# Patient Record
Sex: MALE | HISPANIC OR LATINO | Employment: FULL TIME | URBAN - METROPOLITAN AREA
[De-identification: names, ages, dates, MRNs, and addresses within clinical notes are randomized per-mention and may not be internally consistent; named-entity substitution may affect disease eponyms.]

---

## 2022-01-06 ENCOUNTER — OFFICE VISIT (OUTPATIENT)
Dept: URGENT CARE | Facility: PHYSICIAN GROUP | Age: 47
End: 2022-01-06
Payer: COMMERCIAL

## 2022-01-06 ENCOUNTER — TELEPHONE (OUTPATIENT)
Dept: SCHEDULING | Facility: IMAGING CENTER | Age: 47
End: 2022-01-06

## 2022-01-06 VITALS
RESPIRATION RATE: 18 BRPM | HEART RATE: 99 BPM | TEMPERATURE: 98.2 F | BODY MASS INDEX: 38.34 KG/M2 | OXYGEN SATURATION: 95 % | WEIGHT: 253 LBS | SYSTOLIC BLOOD PRESSURE: 162 MMHG | DIASTOLIC BLOOD PRESSURE: 100 MMHG | HEIGHT: 68 IN

## 2022-01-06 DIAGNOSIS — S20.212D RIB CONTUSION, LEFT, SUBSEQUENT ENCOUNTER: ICD-10-CM

## 2022-01-06 DIAGNOSIS — I10 HYPERTENSION, UNSPECIFIED TYPE: ICD-10-CM

## 2022-01-06 PROCEDURE — 99203 OFFICE O/P NEW LOW 30 MIN: CPT | Performed by: PHYSICIAN ASSISTANT

## 2022-01-06 RX ORDER — AMLODIPINE BESYLATE 10 MG/1
10 TABLET ORAL DAILY
Qty: 30 TABLET | Refills: 0 | Status: SHIPPED | OUTPATIENT
Start: 2022-01-06 | End: 2022-01-11

## 2022-01-06 ASSESSMENT — ENCOUNTER SYMPTOMS
ROS SKIN COMMENTS: NO BRUISING
PALPITATIONS: 0
TINGLING: 0
HEADACHES: 0
MYALGIAS: 1
DIARRHEA: 0
WHEEZING: 0
BLOOD IN STOOL: 0
COUGH: 0
NAUSEA: 0
ABDOMINAL PAIN: 0
VOMITING: 0
BRUISES/BLEEDS EASILY: 0
SPUTUM PRODUCTION: 0
SHORTNESS OF BREATH: 0
DIZZINESS: 0
CHILLS: 0
FEVER: 0
HEMOPTYSIS: 0
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
## 2022-01-07 ENCOUNTER — TELEPHONE (OUTPATIENT)
Dept: SCHEDULING | Facility: IMAGING CENTER | Age: 47
End: 2022-01-07
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
## 2022-01-11 ENCOUNTER — TELEPHONE (OUTPATIENT)
Dept: MEDICAL GROUP | Facility: LAB | Age: 47
End: 2022-01-11

## 2022-01-11 ENCOUNTER — APPOINTMENT (OUTPATIENT)
Dept: RADIOLOGY | Facility: MEDICAL CENTER | Age: 47
End: 2022-01-11
Attending: PHYSICIAN ASSISTANT
Payer: COMMERCIAL

## 2022-01-11 ENCOUNTER — OFFICE VISIT (OUTPATIENT)
Dept: MEDICAL GROUP | Facility: LAB | Age: 47
End: 2022-01-11
Payer: COMMERCIAL

## 2022-01-11 ENCOUNTER — HOSPITAL ENCOUNTER (OUTPATIENT)
Facility: MEDICAL CENTER | Age: 47
End: 2022-01-11
Attending: PHYSICIAN ASSISTANT
Payer: COMMERCIAL

## 2022-01-11 VITALS
SYSTOLIC BLOOD PRESSURE: 156 MMHG | TEMPERATURE: 97.2 F | HEART RATE: 94 BPM | BODY MASS INDEX: 38.34 KG/M2 | OXYGEN SATURATION: 97 % | DIASTOLIC BLOOD PRESSURE: 104 MMHG | HEIGHT: 68 IN | WEIGHT: 253 LBS | RESPIRATION RATE: 18 BRPM

## 2022-01-11 DIAGNOSIS — S20.212D CONTUSION OF RIB, LEFT, SUBSEQUENT ENCOUNTER: ICD-10-CM

## 2022-01-11 DIAGNOSIS — J02.9 SORE THROAT: ICD-10-CM

## 2022-01-11 DIAGNOSIS — S20.212D CONTUSION OF RIB, LEFT, SUBSEQUENT ENCOUNTER: Primary | ICD-10-CM

## 2022-01-11 DIAGNOSIS — I10 HYPERTENSION, UNCONTROLLED: ICD-10-CM

## 2022-01-11 PROCEDURE — U0005 INFEC AGEN DETEC AMPLI PROBE: HCPCS

## 2022-01-11 PROCEDURE — 71101 X-RAY EXAM UNILAT RIBS/CHEST: CPT | Mod: LT

## 2022-01-11 PROCEDURE — 99214 OFFICE O/P EST MOD 30 MIN: CPT | Performed by: PHYSICIAN ASSISTANT

## 2022-01-11 PROCEDURE — U0003 INFECTIOUS AGENT DETECTION BY NUCLEIC ACID (DNA OR RNA); SEVERE ACUTE RESPIRATORY SYNDROME CORONAVIRUS 2 (SARS-COV-2) (CORONAVIRUS DISEASE [COVID-19]), AMPLIFIED PROBE TECHNIQUE, MAKING USE OF HIGH THROUGHPUT TECHNOLOGIES AS DESCRIBED BY CMS-2020-01-R: HCPCS

## 2022-01-11 RX ORDER — AMLODIPINE BESYLATE 10 MG/1
10 TABLET ORAL DAILY
Qty: 90 TABLET | Refills: 1 | Status: SHIPPED | OUTPATIENT
Start: 2022-01-11 | End: 2022-10-20 | Stop reason: SDUPTHER

## 2022-01-11 RX ORDER — TRAMADOL HYDROCHLORIDE 50 MG/1
50 TABLET ORAL EVERY 12 HOURS PRN
Qty: 20 TABLET | Refills: 0 | Status: SHIPPED | OUTPATIENT
Start: 2022-01-11 | End: 2022-01-21

## 2022-01-11 ASSESSMENT — PATIENT HEALTH QUESTIONNAIRE - PHQ9: CLINICAL INTERPRETATION OF PHQ2 SCORE: 0

## 2022-01-11 NOTE — LETTER
January 11, 2022    To Whom It May Concern:         This is confirmation that Guilherme Williamson attended his scheduled appointment with Sandy Torres P.A.-C. on 1/11/22. Please excuse him from work until 01/31/2022 for medical reasons.        If you have any questions please do not hesitate to call me at the phone number listed below.    Sincerely,          Sandy Torres P.A.-C.  [phone]

## 2022-01-12 DIAGNOSIS — J02.9 SORE THROAT: ICD-10-CM

## 2022-01-12 LAB
COVID ORDER STATUS COVID19: NORMAL
SARS-COV-2 RNA RESP QL NAA+PROBE: DETECTED
SPECIMEN SOURCE: ABNORMAL
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
## 2022-01-12 NOTE — ASSESSMENT & PLAN NOTE
New to me; uncontrolled in office, however, he did not take BP medication today  Currently using Amlodipine 10mg.   Previously was using Lisinopril 40mg in addition to Amlodipine.   Has been working on weight loss and has had great success in this over the past few years.

## 2022-01-12 NOTE — TELEPHONE ENCOUNTER
Patient was unable to get xrays due to COVID test being completed today, states he was kicked out because of this. Patient would like to know what his next steps should be.

## 2022-01-13 ENCOUNTER — TELEPHONE (OUTPATIENT)
Dept: MEDICAL GROUP | Facility: LAB | Age: 47
End: 2022-01-13
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
## 2022-01-13 NOTE — TELEPHONE ENCOUNTER
----- Message from Sandy Torres P.A.-C. sent at 1/13/2022  8:07 AM PST -----  Please let Guilherme know:    You are positive for COVID-19.    You can be around others after:   - 10 days since symptoms first appeared and   - 24 hours with no fever without the use of fever-reducing medications and   - Other symptoms of COVID-19 are improving#     If getting much worse, such as trouble breathing, chest pain, confusion, inability to stay awake you need to go to Emergency Room.     In the meantime, your should quarantine as best as you can   - Tell your close contacts that they may have been exposed to COVID-19. An infected person can spread COVID-19 starting 48 hours (or 2 days) before the person has any symptoms or tests positive.     Let me know if you have any questions or concerns.   Sandy Torres P.A.-C.

## 2022-01-28 ENCOUNTER — OFFICE VISIT (OUTPATIENT)
Dept: MEDICAL GROUP | Facility: LAB | Age: 47
End: 2022-01-28
Payer: COMMERCIAL

## 2022-01-28 VITALS
WEIGHT: 259.1 LBS | HEART RATE: 91 BPM | BODY MASS INDEX: 39.27 KG/M2 | SYSTOLIC BLOOD PRESSURE: 146 MMHG | OXYGEN SATURATION: 97 % | HEIGHT: 68 IN | RESPIRATION RATE: 20 BRPM | DIASTOLIC BLOOD PRESSURE: 92 MMHG | TEMPERATURE: 97.5 F

## 2022-01-28 DIAGNOSIS — S20.212D CONTUSION OF RIB, LEFT, SUBSEQUENT ENCOUNTER: ICD-10-CM

## 2022-01-28 DIAGNOSIS — I10 HYPERTENSION, UNSPECIFIED TYPE: ICD-10-CM

## 2022-01-28 PROCEDURE — 99213 OFFICE O/P EST LOW 20 MIN: CPT | Performed by: PHYSICIAN ASSISTANT

## 2022-01-28 RX ORDER — BENZONATATE 200 MG/1
CAPSULE ORAL
COMMUNITY
Start: 2022-01-19 | End: 2022-02-22

## 2022-01-28 RX ORDER — CYCLOBENZAPRINE HCL 10 MG
TABLET ORAL
COMMUNITY
Start: 2022-01-19 | End: 2022-02-22

## 2022-01-28 RX ORDER — AMOXICILLIN 500 MG/1
CAPSULE ORAL
COMMUNITY
Start: 2022-01-19 | End: 2022-01-28

## 2022-01-30 NOTE — ASSESSMENT & PLAN NOTE
Follow up; pt continues to have rub pain, though this is improving.   Had some setbacks as he had covid and has intense cough, which of course aggravated his rib pain.

## 2022-01-30 NOTE — PROGRESS NOTES
"Subjective:   CC: Guilherme Williamson is a 46 y.o. male here today for BP check     HPI:  Contusion of rib, left, subsequent encounter  Follow up; pt continues to have rub pain, though this is improving.   Had some setbacks as he had covid and has intense cough, which of course aggravated his rib pain.     Hypertension  Follow up; BP is improving  Pt continues to have a significant amount of rib pain which is likely causing this to be slightly more elevated.   Will continue to monitor this.        Current medicines (including changes today)  Current Outpatient Medications   Medication Sig Dispense Refill   • amLODIPine (NORVASC) 10 MG Tab Take 1 Tablet by mouth every day. 90 Tablet 1   • benzonatate (TESSALON) 200 MG capsule      • cyclobenzaprine (FLEXERIL) 10 mg Tab        No current facility-administered medications for this visit.     He  has a past medical history of Hypertension.    ROS   + left sided chest was pain, occasional shortness of breath, no abdominal pain       Objective:     /92 (BP Location: Left arm, Patient Position: Sitting, BP Cuff Size: Large adult)   Pulse 91   Temp 36.4 °C (97.5 °F) (Temporal)   Resp 20   Ht 1.727 m (5' 8\")   Wt 118 kg (259 lb 1.6 oz)   SpO2 97%  Body mass index is 39.4 kg/m².   Physical Exam:  Constitutional: Alert, no distress.  Skin: Warm, dry, good turgor, no rashes in visible areas.  Neck: Trachea midline, no masses, no thyromegaly. No cervical or supraclavicular lymphadenopathy  Respiratory: Unlabored respiratory effort, lungs clear to auscultation, no wheezes, no ronchi.  Cardiovascular: Normal S1, S2, no murmur, no edema.  Psych: Alert and oriented x3, normal affect and mood.    Assessment and Plan:   The following treatment plan was discussed    1. Contusion of rib, left, subsequent encounter  Follow up and stable, unfortunately had a bit of a set back with healing due to recent covid-19 infection.   Continue to monitor symptoms  Conservative measures at " this time     2. Hypertension, unspecified type  BP is improving some  F/u in 4 weeks.   Hopefully rib pain will have reduced by then and we can assess BP without subjective pain concerns.     My total time spent caring for the patient on the day of the encounter was 30 minutes.   This does not include time spent on separately billable procedures/tests.    Followup: Return in about 4 weeks (around 2/25/2022), or if symptoms worsen or fail to improve.       Sandy Torres P.A.-C.  Supervising MD: Dr. Max Morales MD  01/29/22

## 2022-01-30 NOTE — ASSESSMENT & PLAN NOTE
Follow up; BP is improving  Pt continues to have a significant amount of rib pain which is likely causing this to be slightly more elevated.   Will continue to monitor this.

## 2022-02-22 ENCOUNTER — TELEMEDICINE (OUTPATIENT)
Dept: MEDICAL GROUP | Facility: LAB | Age: 47
End: 2022-02-22
Payer: COMMERCIAL

## 2022-02-22 VITALS — BODY MASS INDEX: 39.25 KG/M2 | HEIGHT: 68 IN | WEIGHT: 259 LBS

## 2022-02-22 DIAGNOSIS — S20.212D CONTUSION OF RIB, LEFT, SUBSEQUENT ENCOUNTER: ICD-10-CM

## 2022-02-22 DIAGNOSIS — R10.13 EPIGASTRIC DISCOMFORT: ICD-10-CM

## 2022-02-22 DIAGNOSIS — J01.00 SUBACUTE MAXILLARY SINUSITIS: Primary | ICD-10-CM

## 2022-02-22 PROCEDURE — 99214 OFFICE O/P EST MOD 30 MIN: CPT | Mod: 95 | Performed by: PHYSICIAN ASSISTANT

## 2022-02-22 RX ORDER — OMEPRAZOLE 40 MG/1
40 CAPSULE, DELAYED RELEASE ORAL DAILY
Qty: 14 CAPSULE | Refills: 0 | Status: SHIPPED | OUTPATIENT
Start: 2022-02-22 | End: 2022-03-08

## 2022-02-22 RX ORDER — AMOXICILLIN AND CLAVULANATE POTASSIUM 875; 125 MG/1; MG/1
1 TABLET, FILM COATED ORAL 2 TIMES DAILY
Qty: 14 TABLET | Refills: 0 | Status: SHIPPED | OUTPATIENT
Start: 2022-02-22 | End: 2022-03-01
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
## 2022-02-22 NOTE — ASSESSMENT & PLAN NOTE
New to me; reports that things are improving.   Worse in the morning  Using Tylenol with relief.     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 Lift transformers (very heavy).

## 2022-02-22 NOTE — LETTER
February 22, 2022    To Whom It May Concern:         This is confirmation that Guilherme Williamson attended his scheduled appointment with Sandy Torres P.A.-C. on 2/22/22. Please excuse Guilherme until 03/07/2022, due to ongoing medical issues. He may return to work on 03/08/2022, with restrictions of lifting nothing greater 20lbs. Otherwise, he is able to fully operate [employer].        If you have any questions please do not hesitate to call me at the phone number listed below.    Sincerely,  **Signed electronically     Sandy Torres P.A.-C.  [phone]

## 2022-02-22 NOTE — ASSESSMENT & PLAN NOTE
New to me; reports that he has been having increased sinus pain and pressure x1 week - worse when laying down.   Admits to fevers 2 days ago, resolved with Tylenol.   NKDA.

## 2022-06-15 ENCOUNTER — HOSPITAL ENCOUNTER (OUTPATIENT)
Facility: MEDICAL CENTER | Age: 47
End: 2022-06-15
Attending: PHYSICIAN ASSISTANT
Payer: COMMERCIAL

## 2022-06-15 ENCOUNTER — OFFICE VISIT (OUTPATIENT)
Dept: MEDICAL GROUP | Facility: LAB | Age: 47
End: 2022-06-15
Payer: COMMERCIAL

## 2022-06-15 VITALS
HEART RATE: 109 BPM | HEIGHT: 68 IN | WEIGHT: 253 LBS | SYSTOLIC BLOOD PRESSURE: 148 MMHG | OXYGEN SATURATION: 95 % | RESPIRATION RATE: 18 BRPM | TEMPERATURE: 98.1 F | BODY MASS INDEX: 38.34 KG/M2 | DIASTOLIC BLOOD PRESSURE: 90 MMHG

## 2022-06-15 DIAGNOSIS — J06.9 VIRAL UPPER RESPIRATORY TRACT INFECTION: Primary | ICD-10-CM

## 2022-06-15 DIAGNOSIS — J06.9 VIRAL UPPER RESPIRATORY TRACT INFECTION: ICD-10-CM

## 2022-06-15 PROCEDURE — U0005 INFEC AGEN DETEC AMPLI PROBE: HCPCS

## 2022-06-15 PROCEDURE — 99213 OFFICE O/P EST LOW 20 MIN: CPT | Performed by: PHYSICIAN ASSISTANT

## 2022-06-15 PROCEDURE — U0003 INFECTIOUS AGENT DETECTION BY NUCLEIC ACID (DNA OR RNA); SEVERE ACUTE RESPIRATORY SYNDROME CORONAVIRUS 2 (SARS-COV-2) (CORONAVIRUS DISEASE [COVID-19]), AMPLIFIED PROBE TECHNIQUE, MAKING USE OF HIGH THROUGHPUT TECHNOLOGIES AS DESCRIBED BY CMS-2020-01-R: HCPCS

## 2022-06-15 NOTE — LETTER
Leslie 15, 2022    To Whom It May Concern:         This is confirmation that Guilherme Williamson attended his scheduled appointment with Sandy Torres P.A.-C. on 6/15/22. Please excuse him from work from today, 06/15/2022 through 06/20/2022. May return to work on 06/21/2022.          If you have any questions please do not hesitate to call me at the phone number listed below.    Sincerely,          Sandy Torres P.A.-C.  [phone]

## 2022-06-15 NOTE — ASSESSMENT & PLAN NOTE
New to me; symptoms of URI, runny nose Sunday and into Monday.   Had mild sore throat on Monday, worsened yesterday.   Very mild cough.   Ongoing sore throat - improved with a salt water gargles.   Did a Netti-Pot which helps some.   Using Tylenol prn headache.

## 2022-06-16 NOTE — PROGRESS NOTES
"Subjective:   CC: Guilherme Williamson is a 47 y.o. male here today for concerns for URI.     HPI:  Viral upper respiratory tract infection  New to me; symptoms of URI, runny nose Sunday and into Monday.   Had mild sore throat on Monday, worsened yesterday.   Very mild cough.   Ongoing sore throat - improved with a salt water gargles.   Did a Netti-Pot which helps some.   Using Tylenol prn headache.   Wife has been sick with similar symptoms.        Current medicines (including changes today)  Current Outpatient Medications   Medication Sig Dispense Refill   • Acetaminophen (TYLENOL PO) Take  by mouth.     • amLODIPine (NORVASC) 10 MG Tab Take 1 Tablet by mouth every day. 90 Tablet 1     No current facility-administered medications for this visit.     He  has a past medical history of Hypertension.    ROS   No chest pain, no shortness of breath, no abdominal pain       Objective:     BP (!) 148/90 (BP Location: Left arm, Patient Position: Sitting, BP Cuff Size: Large adult)   Pulse (!) 109   Temp 36.7 °C (98.1 °F) (Temporal)   Resp 18   Ht 1.727 m (5' 8\")   Wt 115 kg (253 lb)   SpO2 95%  Body mass index is 38.47 kg/m².  Physical Exam:  Constitutional: Alert, no distress.  Skin: Warm, dry, good turgor, no rashes in visible areas.  Eye: Equal, round, conjunctiva clear, lids normal.  ENMT: Lips without lesions, good dentition, oropharynx clear. TMs pearly gray bilaterally.  Neck: Trachea midline, no masses, no thyromegaly. No cervical or supraclavicular lymphadenopathy  Respiratory: Unlabored respiratory effort, lungs clear to auscultation, no wheezes, no ronchi.  Cardiovascular: Normal S1, S2, no murmur, no edema.  Psych: Alert and oriented x3, normal affect and mood.    Assessment and Plan:   The following treatment plan was discussed    1. Viral upper respiratory tract infection  New concerns, very mild symptoms at this time.   Will swab for COVID.   Covid precautions discussed. Would be OK to return to work on " Monday, 06/20/2022.   OK to continue to use tylenol or motrin prn headaches.   Continue salt water gargles for sore throat.   F/u in 5-7 days if no improvement, [facility].   - SARS-CoV-2 PCR (24 hour In-House): Collect NP swab in VTM; Future      Followup: Return if symptoms worsen or fail to improve.       Sandy Torres P.A.-C.  Supervising MD: Dr. Max Morales MD  06/16/22

## 2022-10-20 DIAGNOSIS — I10 HYPERTENSION, UNCONTROLLED: ICD-10-CM

## 2022-10-20 NOTE — TELEPHONE ENCOUNTER
Received request via: Pharmacy    Was the patient seen in the last year in this department? Yes  6/15/22  Does the patient have an active prescription (recently filled or refills available) for medication(s) requested? No

## 2022-10-21 RX ORDER — AMLODIPINE BESYLATE 10 MG/1
10 TABLET ORAL DAILY
Qty: 90 TABLET | Refills: 1 | Status: SHIPPED | OUTPATIENT
Start: 2022-10-21 | End: 2023-04-03

## 2022-11-17 ENCOUNTER — OFFICE VISIT (OUTPATIENT)
Dept: URGENT CARE | Facility: PHYSICIAN GROUP | Age: 47
End: 2022-11-17
Payer: COMMERCIAL

## 2022-11-17 VITALS
HEART RATE: 94 BPM | OXYGEN SATURATION: 96 % | TEMPERATURE: 98.4 F | HEIGHT: 68 IN | WEIGHT: 255 LBS | BODY MASS INDEX: 38.65 KG/M2 | RESPIRATION RATE: 14 BRPM | SYSTOLIC BLOOD PRESSURE: 150 MMHG | DIASTOLIC BLOOD PRESSURE: 90 MMHG

## 2022-11-17 DIAGNOSIS — E11.9 TYPE 2 DIABETES MELLITUS WITHOUT COMPLICATION, WITHOUT LONG-TERM CURRENT USE OF INSULIN (HCC): ICD-10-CM

## 2022-11-17 DIAGNOSIS — J32.9 RHINOSINUSITIS: ICD-10-CM

## 2022-11-17 DIAGNOSIS — R35.0 URINARY FREQUENCY: ICD-10-CM

## 2022-11-17 LAB
APPEARANCE UR: CLEAR
BILIRUB UR STRIP-MCNC: NEGATIVE MG/DL
COLOR UR AUTO: YELLOW
GLUCOSE UR STRIP.AUTO-MCNC: 1000 MG/DL
KETONES UR STRIP.AUTO-MCNC: NORMAL MG/DL
LEUKOCYTE ESTERASE UR QL STRIP.AUTO: NEGATIVE
NITRITE UR QL STRIP.AUTO: NEGATIVE
PH UR STRIP.AUTO: 6.5 [PH] (ref 5–8)
PROT UR QL STRIP: NORMAL MG/DL
RBC UR QL AUTO: NEGATIVE
SP GR UR STRIP.AUTO: 1.02
UROBILINOGEN UR STRIP-MCNC: 0.2 MG/DL

## 2022-11-17 PROCEDURE — 81002 URINALYSIS NONAUTO W/O SCOPE: CPT | Performed by: FAMILY MEDICINE

## 2022-11-17 PROCEDURE — 99214 OFFICE O/P EST MOD 30 MIN: CPT | Performed by: FAMILY MEDICINE

## 2022-11-17 RX ORDER — AMOXICILLIN AND CLAVULANATE POTASSIUM 875; 125 MG/1; MG/1
1 TABLET, FILM COATED ORAL 2 TIMES DAILY
Qty: 14 TABLET | Refills: 0 | Status: SHIPPED | OUTPATIENT
Start: 2022-11-17 | End: 2022-11-24

## 2022-11-17 NOTE — LETTER
November 17, 2022         Patient: Guilherme Williamson   YOB: 1975   Date of Visit: 11/17/2022           To Whom it May Concern:    Guilherme Williamson was seen in my clinic on 11/17/2022. He may return to work on 11/19/2022.    If you have any questions or concerns, please don't hesitate to call.        Sincerely,           Jerzy Mederos M.D.  Electronically Signed

## 2022-11-20 ASSESSMENT — ENCOUNTER SYMPTOMS
WEIGHT LOSS: 0
NAUSEA: 0
VOMITING: 0
EYE REDNESS: 0
MYALGIAS: 0
EYE DISCHARGE: 0

## 2022-12-04 ENCOUNTER — OFFICE VISIT (OUTPATIENT)
Dept: URGENT CARE | Facility: PHYSICIAN GROUP | Age: 47
End: 2022-12-04
Payer: COMMERCIAL

## 2022-12-04 VITALS
BODY MASS INDEX: 38.88 KG/M2 | TEMPERATURE: 99 F | OXYGEN SATURATION: 96 % | WEIGHT: 255.7 LBS | SYSTOLIC BLOOD PRESSURE: 152 MMHG | DIASTOLIC BLOOD PRESSURE: 86 MMHG | HEART RATE: 82 BPM | RESPIRATION RATE: 15 BRPM

## 2022-12-04 DIAGNOSIS — H69.93 DYSFUNCTION OF BOTH EUSTACHIAN TUBES: ICD-10-CM

## 2022-12-04 DIAGNOSIS — I10 ELEVATED BLOOD PRESSURE READING IN OFFICE WITH DIAGNOSIS OF HYPERTENSION: ICD-10-CM

## 2022-12-04 DIAGNOSIS — H92.09 OTALGIA, UNSPECIFIED LATERALITY: ICD-10-CM

## 2022-12-04 PROCEDURE — 99213 OFFICE O/P EST LOW 20 MIN: CPT | Performed by: PHYSICIAN ASSISTANT

## 2022-12-04 RX ORDER — CETIRIZINE HYDROCHLORIDE 10 MG/1
10 TABLET ORAL DAILY
Qty: 30 TABLET | Refills: 0 | Status: SHIPPED
Start: 2022-12-04 | End: 2023-09-05

## 2022-12-04 RX ORDER — CETIRIZINE HYDROCHLORIDE 10 MG/1
10 TABLET ORAL DAILY
Qty: 30 TABLET | Refills: 0 | Status: SHIPPED | OUTPATIENT
Start: 2022-12-04 | End: 2023-08-02

## 2022-12-04 NOTE — PROGRESS NOTES
"Subjective:   Guilherme Williamson is a 47 y.o. male who presents for Otalgia (LEFT EAR x2 weeks) and Medication Refill     Left ear pain, treated for sinusitis and OM 11/17, never went away entirely, now feels worse.  Describes ear pressure.  Denies fevers or chills.  No sinus symptoms resolved.  Left ear worse than right.  Cannot use steroids due to eye related issue and vision loss, told by specialist not to use steroids.          Review of Systems   HENT:  Positive for ear pain.      Medications:  amLODIPine Tabs  metFORMIN Tabs    Allergies:             Flonase [fluticasone] and Nkda [no known drug allergy]    Surgical History:       No past surgical history on file.    Past Social Hx:  Guilherme Williamson  reports that he quit smoking about 22 years ago. His smoking use included cigarettes. He has a 1.00 pack-year smoking history. He has never used smokeless tobacco. He reports current alcohol use. He reports that he does not use drugs.     Past Family Hx:   Guilherme Williamson family history includes Diabetes in his paternal grandmother; Hypertension in his father; Stroke in his father and paternal grandfather.       Problem list, medications, and allergies reviewed by myself today in Epic.     Objective:     BP (!) 152/86   Pulse 82   Temp 37.2 °C (99 °F) (Temporal)   Resp 15   Ht (P) 1.727 m (5' 8\")   Wt 116 kg (255 lb 11.2 oz)   SpO2 96%   BMI (P) 38.88 kg/m²     Physical Exam  Vitals and nursing note reviewed.   Constitutional:       General: He is not in acute distress.     Appearance: Normal appearance. He is not ill-appearing or toxic-appearing.   HENT:      Head: Normocephalic.      Right Ear: Hearing and external ear normal. No decreased hearing noted. No drainage, swelling or tenderness. No foreign body. Tympanic membrane is injected and retracted. Tympanic membrane is not erythematous or bulging. Tympanic membrane has decreased mobility.      Left Ear: Hearing and external ear normal. No decreased " hearing noted. No drainage, swelling or tenderness. No foreign body. Tympanic membrane is injected and retracted. Tympanic membrane is not erythematous or bulging. Tympanic membrane has decreased mobility.      Ears:      Comments: Decreased TM mobility bilaterally, somewhat retracted, no evidence of infectious process.  Nonerythematous     Nose: Congestion and rhinorrhea present.      Mouth/Throat:      Mouth: Mucous membranes are moist.      Pharynx: Oropharynx is clear. No oropharyngeal exudate or posterior oropharyngeal erythema.      Tonsils: No tonsillar exudate.   Eyes:      General:         Right eye: No discharge.         Left eye: No discharge.      Pupils: Pupils are equal, round, and reactive to light.   Cardiovascular:      Rate and Rhythm: Normal rate and regular rhythm.      Pulses: Normal pulses.      Heart sounds: Normal heart sounds.   Pulmonary:      Effort: Pulmonary effort is normal. No respiratory distress.      Breath sounds: Normal breath sounds. No stridor or decreased air movement. No wheezing, rhonchi or rales.   Musculoskeletal:      Cervical back: Neck supple. No rigidity.   Lymphadenopathy:      Cervical: No cervical adenopathy.   Skin:     General: Skin is warm.   Neurological:      Mental Status: He is alert.       Assessment/Plan:     Diagnosis and Associated Orders:     1. Elevated blood pressure reading in office with diagnosis of hypertension    2. Otalgia, unspecified laterality  - cetirizine (ZYRTEC ALLERGY) 10 MG Tab; Take 1 Tablet by mouth every day.  Dispense: 30 Tablet; Refill: 0  - Referral to ENT  - cetirizine (ZYRTEC ALLERGY) 10 MG Tab; Take 1 Tablet by mouth every day.  Dispense: 30 Tablet; Refill: 0    3. Dysfunction of both eustachian tubes  - cetirizine (ZYRTEC ALLERGY) 10 MG Tab; Take 1 Tablet by mouth every day.  Dispense: 30 Tablet; Refill: 0  - Referral to ENT  - cetirizine (ZYRTEC ALLERGY) 10 MG Tab; Take 1 Tablet by mouth every day.  Dispense: 30 Tablet; Refill:  0      Comments/MDM:  Exam and symptoms consistent with eustachian tube dysfunction post sinusitis and otitis media.  Do not see any evidence of active infectious process.  Recommend nonsedating antihistamine and nasal saline spray.  Cannot use Flonase due to eye related issues per specialist.  Use Tylenol or ibuprofen for pain.  Will place referral to ENT for follow-up if symptoms do not resolve.  Vital signs stable and reassuring other than mildly elevated blood pressure as addressed below.    Blood pressure was elevated today urgent care. He showed me readings from the last few days work that demonstrated blood pressure is well controlled.   Please measure and record your blood pressure 3 times over 2 weeks and keep notes.  If this is the first time this may or may not be cause for alarm.  A one-time elevated blood pressure reading does not mean that you need treatment.  The diagnosis of high blood pressure requires 2 separate blood pressure measurements above 140/90 on different days.  Your blood pressure should be checked when you are relaxed and have been sitting for about 10 minutes.  Injury, illness, emotional stress, caffeine, and some medicine such as decongestants may elevate blood pressure temporarily.  Treatment for hypertension includes both lifestyle changes and medications.  Weight loss and reducing dietary salt is beneficial.  Smoking cessation and exercise are helpful.  Avoid decongestants and recreational drugs that cause hypertension.  Do not drink alcohol in excess.  Seek immediate medical care if you develop a severe headache, blurred or change in vision, confusion, unusual weakness or numbness, severe chest or abdominal pain, vomiting, breathing problems.      I personally reviewed prior external notes and test results pertinent to today's visit.  Red flags discussed as well as indications to present to the Emergency Department.  Supportive care, natural history, differential diagnoses, and  indications for immediate follow-up discussed.  Patient expresses understanding and agrees to plan.  Patient denies any other questions or concerns.    Follow-up with the primary care physician for recheck, reevaluation, and consideration of further management.      Please note that this dictation was created using voice recognition software. I have made a reasonable attempt to correct obvious errors, but I expect that there are errors of grammar and possibly content that I did not discover before finalizing the note.    This note was electronically signed by Jeanne Donnelly PA-C

## 2023-04-01 DIAGNOSIS — I10 HYPERTENSION, UNCONTROLLED: ICD-10-CM

## 2023-04-03 RX ORDER — AMLODIPINE BESYLATE 10 MG/1
TABLET ORAL
Qty: 60 TABLET | Refills: 0 | Status: SHIPPED | OUTPATIENT
Start: 2023-04-03 | End: 2023-05-30

## 2023-04-03 NOTE — TELEPHONE ENCOUNTER
My chart message sent to patient, Previously notified via [employer]. Notified Pharmacy needs new PCP to refills scripts.

## 2023-04-03 NOTE — TELEPHONE ENCOUNTER
Received request via: Pharmacy    Was the patient seen in the last year in this department? No    Does the patient have an active prescription (recently filled or refills available) for medication(s) requested? No    Does the patient have [facility] and need 100 day supply (blood pressure, diabetes and cholesterol meds only)? Patient does not have SCP    amLODIPine Besylate Oral Tablet 10 MG

## 2023-05-29 DIAGNOSIS — I10 HYPERTENSION, UNCONTROLLED: ICD-10-CM

## 2023-05-30 RX ORDER — AMLODIPINE BESYLATE 10 MG/1
TABLET ORAL
Qty: 60 TABLET | Refills: 0 | Status: SHIPPED | OUTPATIENT
Start: 2023-05-30 | End: 2023-08-02 | Stop reason: SDUPTHER

## 2023-05-30 NOTE — TELEPHONE ENCOUNTER
Received request via: Pharmacy    Was the patient seen in the last year in this department? Yes  LOV 06/15/2022  Does the patient have an active prescription (recently filled or refills available) for medication(s) requested? No    Does the patient have [facility] Plus and need 100 day supply (blood pressure, diabetes and cholesterol meds only)? Patient does not have SCP

## 2023-08-02 ENCOUNTER — OFFICE VISIT (OUTPATIENT)
Dept: MEDICAL GROUP | Facility: LAB | Age: 48
End: 2023-08-02
Payer: COMMERCIAL

## 2023-08-02 VITALS
TEMPERATURE: 97.9 F | SYSTOLIC BLOOD PRESSURE: 148 MMHG | BODY MASS INDEX: 37.79 KG/M2 | DIASTOLIC BLOOD PRESSURE: 90 MMHG | HEART RATE: 84 BPM | RESPIRATION RATE: 14 BRPM | HEIGHT: 68 IN | OXYGEN SATURATION: 94 % | WEIGHT: 249.34 LBS

## 2023-08-02 DIAGNOSIS — I10 HYPERTENSION, UNCONTROLLED: ICD-10-CM

## 2023-08-02 DIAGNOSIS — I10 HYPERTENSION, UNSPECIFIED TYPE: ICD-10-CM

## 2023-08-02 DIAGNOSIS — Z12.11 COLON CANCER SCREENING: ICD-10-CM

## 2023-08-02 DIAGNOSIS — J34.89 SINUS PRESSURE: ICD-10-CM

## 2023-08-02 DIAGNOSIS — Z00.00 HEALTHCARE MAINTENANCE: ICD-10-CM

## 2023-08-02 PROCEDURE — 99203 OFFICE O/P NEW LOW 30 MIN: CPT | Performed by: STUDENT IN AN ORGANIZED HEALTH CARE EDUCATION/TRAINING PROGRAM

## 2023-08-02 PROCEDURE — 3080F DIAST BP >= 90 MM HG: CPT | Performed by: STUDENT IN AN ORGANIZED HEALTH CARE EDUCATION/TRAINING PROGRAM

## 2023-08-02 PROCEDURE — 3077F SYST BP >= 140 MM HG: CPT | Performed by: STUDENT IN AN ORGANIZED HEALTH CARE EDUCATION/TRAINING PROGRAM

## 2023-08-02 RX ORDER — NAPROXEN 500 MG/1
500 TABLET ORAL 2 TIMES DAILY WITH MEALS
Qty: 60 TABLET | Refills: 0 | Status: SHIPPED | OUTPATIENT
Start: 2023-08-02 | End: 2023-08-29

## 2023-08-02 RX ORDER — AMLODIPINE BESYLATE 10 MG/1
10 TABLET ORAL DAILY
Qty: 60 TABLET | Refills: 0 | Status: SHIPPED | OUTPATIENT
Start: 2023-08-02 | End: 2023-09-21

## 2023-08-02 ASSESSMENT — ENCOUNTER SYMPTOMS
SHORTNESS OF BREATH: 0
SINUS PAIN: 1
FEVER: 0
CHILLS: 0

## 2023-08-02 ASSESSMENT — PATIENT HEALTH QUESTIONNAIRE - PHQ9: CLINICAL INTERPRETATION OF PHQ2 SCORE: 0

## 2023-08-02 NOTE — PROGRESS NOTES
"Subjective:     CC: Establishing with new provider     HPI:   Guilherme presents today for the following;    Problem   Sinus Pressure    Patient reports he has been having sinus pressure for the past 2 weeks with ear pressure. No sinus drainage or fevers. He has had this issue in the past .     Hypertension    Is on amlodipine 10mg, he ran out of the prescription and his BP has been elevated          Current Outpatient Medications Ordered in Epic   Medication Sig Dispense Refill    amLODIPine (NORVASC) 10 MG Tab Take 1 Tablet by mouth every day. 60 Tablet 0    cetirizine (ZYRTEC ALLERGY) 10 MG Tab Take 1 Tablet by mouth every day. 30 Tablet 0     No current Epic-ordered facility-administered medications on file.           ROS:  Review of Systems   Constitutional:  Negative for chills and fever.   HENT:  Positive for congestion and sinus pain.    Respiratory:  Negative for shortness of breath.    Cardiovascular:  Negative for chest pain.       Objective:     Exam:  BP (!) 148/90 (BP Location: Right arm, Patient Position: Sitting, BP Cuff Size: Large adult long)   Pulse 84   Temp 36.6 °C (97.9 °F)   Resp 14   Ht 1.727 m (5' 8\")   Wt 113 kg (249 lb 5.4 oz)   SpO2 94%   BMI 37.91 kg/m²  Body mass index is 37.91 kg/m².    Physical Exam  Constitutional:       General: He is not in acute distress.     Appearance: He is not ill-appearing.   HENT:      Right Ear: Tympanic membrane, ear canal and external ear normal.      Left Ear: Tympanic membrane, ear canal and external ear normal.   Pulmonary:      Effort: Pulmonary effort is normal.   Neurological:      Mental Status: He is alert.   Psychiatric:         Mood and Affect: Mood normal.         Behavior: Behavior normal.         Thought Content: Thought content normal.         Judgment: Judgment normal.               Assessment & Plan:     Problem List Items Addressed This Visit       Hypertension     Chronic  -fill amlodipine for 60 days, check labs          Relevant " Medications    amLODIPine (NORVASC) 10 MG Tab    Sinus pressure     Acute  -patient can not use steroids of any kind due to eye problems  -will provide NSAID for inflammation, pt told to take this with an antihistamine.   -if no improvement will prescribe antibiotics if not improvement in 5 days           Other Visit Diagnoses       Colon cancer screening        Relevant Orders    COLOGUARD (FIT DNA)    Hypertension, uncontrolled        Relevant Medications    amLODIPine (NORVASC) 10 MG Tab    Healthcare maintenance        Relevant Orders    VITAMIN D,25 HYDROXY (DEFICIENCY)    HEMOGLOBIN A1C    CBC WITH DIFFERENTIAL    Comp Metabolic Panel    TSH WITH REFLEX TO FT4    Lipid Profile            1 month f/u    Please note that this dictation was created using voice recognition software. I have made every reasonable attempt to correct obvious errors, but I expect that there are errors of grammar and possibly content that I did not discover before finalizing the note.

## 2023-08-02 NOTE — ASSESSMENT & PLAN NOTE
Acute  -patient can not use steroids of any kind due to eye problems  -will provide NSAID for inflammation, pt told to take this with an antihistamine.   -if no improvement will prescribe antibiotics if not improvement in 5 days

## 2023-08-09 DIAGNOSIS — J01.90 ACUTE SINUSITIS, RECURRENCE NOT SPECIFIED, UNSPECIFIED LOCATION: ICD-10-CM

## 2023-08-09 RX ORDER — AZITHROMYCIN 250 MG/1
TABLET, FILM COATED ORAL
Qty: 6 TABLET | Refills: 0 | Status: SHIPPED
Start: 2023-08-09 | End: 2023-08-06

## 2023-08-27 DIAGNOSIS — J34.89 SINUS PRESSURE: ICD-10-CM

## 2023-08-28 NOTE — TELEPHONE ENCOUNTER
Received request via: Pharmacy    Was the patient seen in the last year in this department? Yes    Does the patient have an active prescription (recently filled or refills available) for medication(s) requested? No    Does the patient have residential [facility] and need 100 day supply (blood pressure, diabetes and cholesterol meds only)? Patient does not have SCP      Naproxen Oral Tablet 500 MG

## 2023-08-29 RX ORDER — NAPROXEN 500 MG/1
500 TABLET ORAL 2 TIMES DAILY WITH MEALS
Qty: 60 TABLET | Refills: 0 | Status: SHIPPED
Start: 2023-08-29 | End: 2023-10-23

## 2023-09-05 ENCOUNTER — OFFICE VISIT (OUTPATIENT)
Dept: URGENT CARE | Facility: PHYSICIAN GROUP | Age: 48
End: 2023-09-05
Payer: COMMERCIAL

## 2023-09-05 VITALS
BODY MASS INDEX: 37.22 KG/M2 | OXYGEN SATURATION: 98 % | RESPIRATION RATE: 16 BRPM | WEIGHT: 245.59 LBS | SYSTOLIC BLOOD PRESSURE: 168 MMHG | DIASTOLIC BLOOD PRESSURE: 108 MMHG | HEART RATE: 104 BPM | TEMPERATURE: 98.3 F | HEIGHT: 68 IN

## 2023-09-05 DIAGNOSIS — H65.02 NON-RECURRENT ACUTE SEROUS OTITIS MEDIA OF LEFT EAR: ICD-10-CM

## 2023-09-05 DIAGNOSIS — R09.81 SINUS CONGESTION: ICD-10-CM

## 2023-09-05 PROCEDURE — 99213 OFFICE O/P EST LOW 20 MIN: CPT

## 2023-09-05 PROCEDURE — 3077F SYST BP >= 140 MM HG: CPT

## 2023-09-05 PROCEDURE — 3080F DIAST BP >= 90 MM HG: CPT

## 2023-09-05 RX ORDER — AMOXICILLIN AND CLAVULANATE POTASSIUM 875; 125 MG/1; MG/1
1 TABLET, FILM COATED ORAL 2 TIMES DAILY
Qty: 14 TABLET | Refills: 0 | Status: SHIPPED | OUTPATIENT
Start: 2023-09-05 | End: 2023-09-12

## 2023-09-05 NOTE — LETTER
[facility] [address] [address]     September 5, 2023    Patient: Guilherme Williamson   YOB: 1975   Date of Visit: 9/5/2023       To Whom It May Concern:    Guilherme Williamson was seen and treated in our department on 9/5/2023. Excuse 09/06 as well.      Sincerely,     EVERETTE Woodard.

## 2023-09-05 NOTE — PROGRESS NOTES
Chief Complaint   Patient presents with    Otalgia     Ear pain in RT ear and underneath ear, both ears are clogged, hot flashes, dizziness, since last night and has been worsening.        HISTORY OF PRESENT ILLNESS: Patient is a pleasant 48 y.o. male who presents to urgent care today left-sided ear pain for the last day.  Patient denies any fevers, he does note ongoing sinus pain and pressure as well.  He has not taken anything for his symptoms, denies any major fevers.    Patient Active Problem List    Diagnosis Date Noted    Sinus pressure 2023    Viral upper respiratory tract infection 06/15/2022    Subacute maxillary sinusitis 2022    Contusion of rib, left, subsequent encounter 2022    Hypertension 2014    Tinea cruris 2014    Hyperglycemia 2014    Dyslipidemia 2014    BMI 38.0-38.9,adult 2014       Allergies:Flonase [fluticasone]    Current Outpatient Medications Ordered in Epic   Medication Sig Dispense Refill    amoxicillin-clavulanate (AUGMENTIN) 875-125 MG Tab Take 1 Tablet by mouth 2 times a day for 7 days. 14 Tablet 0    naproxen (NAPROSYN) 500 MG Tab TAKE ONE TABLET BY MOUTH TWICE DAILY WITH FOOD 60 Tablet 0    amLODIPine (NORVASC) 10 MG Tab Take 1 Tablet by mouth every day. 60 Tablet 0     No current Epic-ordered facility-administered medications on file.       Past Medical History:   Diagnosis Date    Hypertension        Social History     Tobacco Use    Smoking status: Former     Current packs/day: 0.00     Average packs/day: 0.3 packs/day for 4.0 years (1.0 ttl pk-yrs)     Types: Cigarettes     Start date: 1996     Quit date: 2000     Years since quittin.1    Smokeless tobacco: Never    Tobacco comments:     3-5 years ago   Vaping Use    Vaping Use: Never used   Substance Use Topics    Alcohol use: Yes     Alcohol/week: 0.0 oz     Comment: a couple beers a month    Drug use: No       Family Status   Relation Name Status    Fa  Alive     "MGFa  Alive    PGMo  Alive    Mo  Alive    Sis  Alive    Bro  Alive    MGMo   at age 32        pneumonia    PGFa      Sis  Alive    Sis  Alive    Sis  Alive    Neg Hx  (Not Specified)     Family History   Problem Relation Age of Onset    Stroke Father     Hypertension Father     Diabetes Paternal Grandmother     Stroke Paternal Grandfather     Cancer Neg Hx     Heart Disease Neg Hx        ROS:  Review of Systems   Constitutional: Negative for fever, chills, weight loss, malaise, and fatigue.   HENT: Positive for left-sided ear pain, negative positive sinus pressure nosebleeds, congestion, sore throat and neck pain.    Eyes: Negative for vision changes.   Neuro: Negative for headache, sensory changes, weakness, seizure, LOC.   Cardiovascular: Negative for chest pain, palpitations, orthopnea and leg swelling.   Respiratory: Negative for cough, sputum production, shortness of breath and wheezing.   Gastrointestinal: Negative for abdominal pain, nausea, vomiting or diarrhea.   Genitourinary: Negative for dysuria, urgency and frequency.  Musculoskeletal: Negative for falls, neck pain, back pain, joint pain, myalgias.   Skin: Negative for rash, diaphoresis.     Exam:  BP (!) 168/108 (BP Location: Right arm, Patient Position: Sitting, BP Cuff Size: Adult)   Pulse (!) 104   Temp 36.8 °C (98.3 °F) (Temporal)   Resp 16   Ht 1.727 m (5' 8\")   Wt 111 kg (245 lb 9.5 oz)   SpO2 98%   General: well-nourished, well-developed male in NAD  Head: normocephalic, atraumatic  Eyes: PERRLA, no conjunctival injection, acuity grossly intact, lids normal.  Ears: normal shape and symmetry, no tenderness, no discharge. External canals are without any significant edema or erythema. Tympanic membranes have noted inflammation, with effusion effusion on the left, right-sided is within normal limits. Gross auditory acuity is intact.  Nose: symmetrical without tenderness, no discharge.  Noted sinus pain and pressure with light " palpation to the sinuses.  Mouth/Throat: reasonable hygiene, no erythema, exudates or tonsillar enlargement.  Neck: no masses, range of motion within normal limits, no tracheal deviation. No obvious thyroid enlargement.   Lymph: no cervical adenopathy. No supraclavicular adenopathy.   Neuro: alert and oriented. Cranial nerves 1-12 grossly intact. No sensory deficit.   Cardiovascular: regular rate and rhythm. No edema.  Pulmonary: no distress. Chest is symmetrical with respiration, no wheezes, crackles, or rhonchi.   Abdomen: soft, non-tender, no guarding, no hepatosplenomegaly.  Musculoskeletal: no clubbing, appropriate muscle tone, gait is stable.  Skin: warm, dry, intact, no clubbing, no cyanosis, no rashes.   Psych: appropriate mood, affect, judgement.       Assessment/Plan:  1. Non-recurrent acute serous otitis media of left ear  amoxicillin-clavulanate (AUGMENTIN) 875-125 MG Tab      Patient is a pleasant 48 y.o. male who presents to urgent care today left-sided ear pain for the last day.  Patient denies any fevers, he does note ongoing sinus pain and pressure as well.  He has not taken anything for his symptoms, denies any major fevers.  On physical exam patient has noted inflammation, with effusion effusion on the left, right-sided is within normal limits.  Patient placed on Augmentin, this should cover for potential sinusitis as well patient does have this due to his ongoing increased sinus pain and pressure.  Patient encouraged to drink plenty of fluids, Motrin and Tylenol for any ongoing discomfort, take medication with food.  Advised to follow-up if he continues to get worse or does not improve.    Noted elevated blood pressure and slightly elevated heart rate today here in the office.  Patient states he has not taken his Norvasc.  He was unsure if he should as he was coming to the doctor.  Encourage patient to please take his medications as ordered.  Advised to follow-up if he has blood pressure  continues to be elevated.      Supportive care, differential diagnoses, and indications for immediate follow-up discussed with patient.   Pathogenesis of diagnosis discussed including typical length and natural progression.   Instructed to return to clinic or nearest emergency department for any change in condition, further concerns, or worsening of symptoms.  Patient states understanding of the plan of care and discharge instructions.  Instructed to make an appointment, for follow up, with primary care provider.      Please note that this dictation was created using voice recognition software. I have made every reasonable attempt to correct obvious errors, but I expect that there are errors of grammar and possibly content that I did not discover before finalizing the note.      Irina CALDERON

## 2023-10-07 SDOH — ECONOMIC STABILITY: TRANSPORTATION INSECURITY
IN THE PAST 12 MONTHS, HAS THE LACK OF TRANSPORTATION KEPT YOU FROM MEDICAL APPOINTMENTS OR FROM GETTING MEDICATIONS?: NO

## 2023-10-07 SDOH — HEALTH STABILITY: PHYSICAL HEALTH: ON AVERAGE, HOW MANY DAYS PER WEEK DO YOU ENGAGE IN MODERATE TO STRENUOUS EXERCISE (LIKE A BRISK WALK)?: 5 DAYS

## 2023-10-07 SDOH — HEALTH STABILITY: MENTAL HEALTH
STRESS IS WHEN SOMEONE FEELS TENSE, NERVOUS, ANXIOUS, OR CAN'T SLEEP AT NIGHT BECAUSE THEIR MIND IS TROUBLED. HOW STRESSED ARE YOU?: TO SOME EXTENT

## 2023-10-07 SDOH — ECONOMIC STABILITY: INCOME INSECURITY: IN THE LAST 12 MONTHS, WAS THERE A TIME WHEN YOU WERE NOT ABLE TO PAY THE MORTGAGE OR RENT ON TIME?: NO

## 2023-10-07 SDOH — ECONOMIC STABILITY: INCOME INSECURITY: HOW HARD IS IT FOR YOU TO PAY FOR THE VERY BASICS LIKE FOOD, HOUSING, MEDICAL CARE, AND HEATING?: PATIENT DECLINED

## 2023-10-07 SDOH — ECONOMIC STABILITY: HOUSING INSECURITY
IN THE LAST 12 MONTHS, WAS THERE A TIME WHEN YOU DID NOT HAVE A STEADY PLACE TO SLEEP OR SLEPT IN A SHELTER (INCLUDING NOW)?: NO

## 2023-10-07 SDOH — ECONOMIC STABILITY: FOOD INSECURITY: WITHIN THE PAST 12 MONTHS, YOU WORRIED THAT YOUR FOOD WOULD RUN OUT BEFORE YOU GOT MONEY TO BUY MORE.: NEVER TRUE

## 2023-10-07 SDOH — HEALTH STABILITY: PHYSICAL HEALTH: ON AVERAGE, HOW MANY MINUTES DO YOU ENGAGE IN EXERCISE AT THIS LEVEL?: 20 MIN

## 2023-10-07 SDOH — ECONOMIC STABILITY: FOOD INSECURITY: WITHIN THE PAST 12 MONTHS, THE FOOD YOU BOUGHT JUST DIDN'T LAST AND YOU DIDN'T HAVE MONEY TO GET MORE.: NEVER TRUE

## 2023-10-07 SDOH — ECONOMIC STABILITY: TRANSPORTATION INSECURITY
IN THE PAST 12 MONTHS, HAS LACK OF TRANSPORTATION KEPT YOU FROM MEETINGS, WORK, OR FROM GETTING THINGS NEEDED FOR DAILY LIVING?: NO

## 2023-10-07 SDOH — ECONOMIC STABILITY: HOUSING INSECURITY: IN THE LAST 12 MONTHS, HOW MANY PLACES HAVE YOU LIVED?: 1

## 2023-10-07 SDOH — ECONOMIC STABILITY: TRANSPORTATION INSECURITY
IN THE PAST 12 MONTHS, HAS LACK OF RELIABLE TRANSPORTATION KEPT YOU FROM MEDICAL APPOINTMENTS, MEETINGS, WORK OR FROM GETTING THINGS NEEDED FOR DAILY LIVING?: NO

## 2023-10-07 ASSESSMENT — LIFESTYLE VARIABLES
HOW OFTEN DO YOU HAVE A DRINK CONTAINING ALCOHOL: MONTHLY OR LESS
HOW OFTEN DO YOU HAVE A DRINK CONTAINING ALCOHOL: MONTHLY OR LESS
SKIP TO QUESTIONS 9-10: 1
HOW OFTEN DO YOU HAVE SIX OR MORE DRINKS ON ONE OCCASION: NEVER
AUDIT-C TOTAL SCORE: 1
SKIP TO QUESTIONS 9-10: 1
HOW MANY STANDARD DRINKS CONTAINING ALCOHOL DO YOU HAVE ON A TYPICAL DAY: 1 OR 2
HOW OFTEN DO YOU HAVE A DRINK CONTAINING ALCOHOL: MONTHLY OR LESS
HOW MANY STANDARD DRINKS CONTAINING ALCOHOL DO YOU HAVE ON A TYPICAL DAY: 1 OR 2
HOW OFTEN DO YOU HAVE SIX OR MORE DRINKS ON ONE OCCASION: NEVER
AUDIT-C TOTAL SCORE: 1
AUDIT-C TOTAL SCORE: 1
HOW MANY STANDARD DRINKS CONTAINING ALCOHOL DO YOU HAVE ON A TYPICAL DAY: 1 OR 2
HOW OFTEN DO YOU HAVE SIX OR MORE DRINKS ON ONE OCCASION: NEVER
SKIP TO QUESTIONS 9-10: 1

## 2023-10-07 ASSESSMENT — SOCIAL DETERMINANTS OF HEALTH (SDOH)
DO YOU BELONG TO ANY CLUBS OR ORGANIZATIONS SUCH AS CHURCH GROUPS UNIONS, FRATERNAL OR ATHLETIC GROUPS, OR SCHOOL GROUPS?: NO
DO YOU BELONG TO ANY CLUBS OR ORGANIZATIONS SUCH AS CHURCH GROUPS UNIONS, FRATERNAL OR ATHLETIC GROUPS, OR SCHOOL GROUPS?: NO
HOW MANY DRINKS CONTAINING ALCOHOL DO YOU HAVE ON A TYPICAL DAY WHEN YOU ARE DRINKING: 1 OR 2
IN A TYPICAL WEEK, HOW MANY TIMES DO YOU TALK ON THE PHONE WITH FAMILY, FRIENDS, OR NEIGHBORS?: MORE THAN THREE TIMES A WEEK
HOW OFTEN DO YOU GET TOGETHER WITH FRIENDS OR RELATIVES?: MORE THAN THREE TIMES A WEEK
DO YOU BELONG TO ANY CLUBS OR ORGANIZATIONS SUCH AS CHURCH GROUPS UNIONS, FRATERNAL OR ATHLETIC GROUPS, OR SCHOOL GROUPS?: NO
HOW OFTEN DO YOU ATTEND CHURCH OR RELIGIOUS SERVICES?: NEVER
IN A TYPICAL WEEK, HOW MANY TIMES DO YOU TALK ON THE PHONE WITH FAMILY, FRIENDS, OR NEIGHBORS?: MORE THAN THREE TIMES A WEEK
HOW OFTEN DO YOU ATTEND CHURCH OR RELIGIOUS SERVICES?: NEVER
HOW OFTEN DO YOU HAVE SIX OR MORE DRINKS ON ONE OCCASION: NEVER
HOW OFTEN DO YOU GET TOGETHER WITH FRIENDS OR RELATIVES?: MORE THAN THREE TIMES A WEEK
HOW OFTEN DO YOU ATTENT MEETINGS OF THE CLUB OR ORGANIZATION YOU BELONG TO?: NEVER
HOW OFTEN DO YOU ATTEND CHURCH OR RELIGIOUS SERVICES?: NEVER
DO YOU BELONG TO ANY CLUBS OR ORGANIZATIONS SUCH AS CHURCH GROUPS UNIONS, FRATERNAL OR ATHLETIC GROUPS, OR SCHOOL GROUPS?: NO
HOW OFTEN DO YOU ATTENT MEETINGS OF THE CLUB OR ORGANIZATION YOU BELONG TO?: NEVER
HOW OFTEN DO YOU ATTENT MEETINGS OF THE CLUB OR ORGANIZATION YOU BELONG TO?: NEVER
IN A TYPICAL WEEK, HOW MANY TIMES DO YOU TALK ON THE PHONE WITH FAMILY, FRIENDS, OR NEIGHBORS?: MORE THAN THREE TIMES A WEEK
HOW OFTEN DO YOU ATTEND CHURCH OR RELIGIOUS SERVICES?: NEVER
HOW OFTEN DO YOU GET TOGETHER WITH FRIENDS OR RELATIVES?: MORE THAN THREE TIMES A WEEK
HOW OFTEN DO YOU ATTENT MEETINGS OF THE CLUB OR ORGANIZATION YOU BELONG TO?: NEVER
HOW OFTEN DO YOU HAVE A DRINK CONTAINING ALCOHOL: MONTHLY OR LESS
HOW HARD IS IT FOR YOU TO PAY FOR THE VERY BASICS LIKE FOOD, HOUSING, MEDICAL CARE, AND HEATING?: PATIENT DECLINED
HOW OFTEN DO YOU GET TOGETHER WITH FRIENDS OR RELATIVES?: MORE THAN THREE TIMES A WEEK
WITHIN THE PAST 12 MONTHS, YOU WORRIED THAT YOUR FOOD WOULD RUN OUT BEFORE YOU GOT THE MONEY TO BUY MORE: NEVER TRUE
IN A TYPICAL WEEK, HOW MANY TIMES DO YOU TALK ON THE PHONE WITH FAMILY, FRIENDS, OR NEIGHBORS?: MORE THAN THREE TIMES A WEEK

## 2023-10-09 SDOH — HEALTH STABILITY: PHYSICAL HEALTH: ON AVERAGE, HOW MANY DAYS PER WEEK DO YOU ENGAGE IN MODERATE TO STRENUOUS EXERCISE (LIKE A BRISK WALK)?: 5 DAYS

## 2023-10-09 SDOH — HEALTH STABILITY: PHYSICAL HEALTH: ON AVERAGE, HOW MANY MINUTES DO YOU ENGAGE IN EXERCISE AT THIS LEVEL?: 20 MIN

## 2023-10-09 SDOH — ECONOMIC STABILITY: HOUSING INSECURITY: IN THE LAST 12 MONTHS, HOW MANY PLACES HAVE YOU LIVED?: 1

## 2023-10-09 ASSESSMENT — SOCIAL DETERMINANTS OF HEALTH (SDOH)
HOW HARD IS IT FOR YOU TO PAY FOR THE VERY BASICS LIKE FOOD, HOUSING, MEDICAL CARE, AND HEATING?: PATIENT DECLINED
HOW HARD IS IT FOR YOU TO PAY FOR THE VERY BASICS LIKE FOOD, HOUSING, MEDICAL CARE, AND HEATING?: PATIENT DECLINED
WITHIN THE PAST 12 MONTHS, YOU WORRIED THAT YOUR FOOD WOULD RUN OUT BEFORE YOU GOT THE MONEY TO BUY MORE: NEVER TRUE
HOW OFTEN DO YOU GET TOGETHER WITH FRIENDS OR RELATIVES?: MORE THAN THREE TIMES A WEEK
DO YOU BELONG TO ANY CLUBS OR ORGANIZATIONS SUCH AS CHURCH GROUPS UNIONS, FRATERNAL OR ATHLETIC GROUPS, OR SCHOOL GROUPS?: NO
HOW OFTEN DO YOU HAVE A DRINK CONTAINING ALCOHOL: MONTHLY OR LESS
HOW OFTEN DO YOU ATTEND CHURCH OR RELIGIOUS SERVICES?: NEVER
HOW OFTEN DO YOU HAVE A DRINK CONTAINING ALCOHOL: MONTHLY OR LESS
HOW OFTEN DO YOU ATTENT MEETINGS OF THE CLUB OR ORGANIZATION YOU BELONG TO?: NEVER
DO YOU BELONG TO ANY CLUBS OR ORGANIZATIONS SUCH AS CHURCH GROUPS UNIONS, FRATERNAL OR ATHLETIC GROUPS, OR SCHOOL GROUPS?: NO
HOW OFTEN DO YOU HAVE SIX OR MORE DRINKS ON ONE OCCASION: NEVER
HOW OFTEN DO YOU GET TOGETHER WITH FRIENDS OR RELATIVES?: MORE THAN THREE TIMES A WEEK
HOW MANY DRINKS CONTAINING ALCOHOL DO YOU HAVE ON A TYPICAL DAY WHEN YOU ARE DRINKING: 1 OR 2
HOW OFTEN DO YOU HAVE SIX OR MORE DRINKS ON ONE OCCASION: NEVER
IN A TYPICAL WEEK, HOW MANY TIMES DO YOU TALK ON THE PHONE WITH FAMILY, FRIENDS, OR NEIGHBORS?: MORE THAN THREE TIMES A WEEK
HOW OFTEN DO YOU ATTENT MEETINGS OF THE CLUB OR ORGANIZATION YOU BELONG TO?: NEVER
HOW OFTEN DO YOU ATTEND CHURCH OR RELIGIOUS SERVICES?: NEVER
WITHIN THE PAST 12 MONTHS, YOU WORRIED THAT YOUR FOOD WOULD RUN OUT BEFORE YOU GOT THE MONEY TO BUY MORE: NEVER TRUE
HOW MANY DRINKS CONTAINING ALCOHOL DO YOU HAVE ON A TYPICAL DAY WHEN YOU ARE DRINKING: 1 OR 2
IN A TYPICAL WEEK, HOW MANY TIMES DO YOU TALK ON THE PHONE WITH FAMILY, FRIENDS, OR NEIGHBORS?: MORE THAN THREE TIMES A WEEK

## 2023-10-10 ENCOUNTER — APPOINTMENT (OUTPATIENT)
Dept: MEDICAL GROUP | Facility: LAB | Age: 48
End: 2023-10-10
Payer: COMMERCIAL

## 2023-10-23 ENCOUNTER — APPOINTMENT (OUTPATIENT)
Dept: MEDICAL GROUP | Facility: LAB | Age: 48
End: 2023-10-23
Payer: COMMERCIAL

## 2023-10-23 ENCOUNTER — OFFICE VISIT (OUTPATIENT)
Dept: MEDICAL GROUP | Facility: LAB | Age: 48
End: 2023-10-23
Payer: COMMERCIAL

## 2023-10-23 VITALS
HEART RATE: 90 BPM | DIASTOLIC BLOOD PRESSURE: 96 MMHG | RESPIRATION RATE: 14 BRPM | BODY MASS INDEX: 37.83 KG/M2 | OXYGEN SATURATION: 95 % | HEIGHT: 68 IN | WEIGHT: 249.6 LBS | SYSTOLIC BLOOD PRESSURE: 164 MMHG | TEMPERATURE: 97.5 F

## 2023-10-23 DIAGNOSIS — I10 HYPERTENSION, UNCONTROLLED: ICD-10-CM

## 2023-10-23 DIAGNOSIS — J32.0 CHRONIC MAXILLARY SINUSITIS: ICD-10-CM

## 2023-10-23 DIAGNOSIS — J32.4 CHRONIC PANSINUSITIS: ICD-10-CM

## 2023-10-23 DIAGNOSIS — J34.89 SINUS PRESSURE: ICD-10-CM

## 2023-10-23 DIAGNOSIS — Z76.89 ENCOUNTER TO ESTABLISH CARE: ICD-10-CM

## 2023-10-23 DIAGNOSIS — F43.0 STRESS REACTION: ICD-10-CM

## 2023-10-23 PROBLEM — S20.212D: Status: RESOLVED | Noted: 2022-01-11 | Resolved: 2023-10-23

## 2023-10-23 PROBLEM — J06.9 VIRAL UPPER RESPIRATORY TRACT INFECTION: Status: RESOLVED | Noted: 2022-06-15 | Resolved: 2023-10-23

## 2023-10-23 PROCEDURE — 3080F DIAST BP >= 90 MM HG: CPT | Performed by: NURSE PRACTITIONER

## 2023-10-23 PROCEDURE — 99214 OFFICE O/P EST MOD 30 MIN: CPT | Performed by: NURSE PRACTITIONER

## 2023-10-23 PROCEDURE — 3077F SYST BP >= 140 MM HG: CPT | Performed by: NURSE PRACTITIONER

## 2023-10-23 RX ORDER — DOXYCYCLINE HYCLATE 100 MG
100 TABLET ORAL 2 TIMES DAILY
Qty: 20 TABLET | Refills: 0 | Status: SHIPPED
Start: 2023-10-23 | End: 2023-12-06

## 2023-10-23 RX ORDER — AMLODIPINE BESYLATE 10 MG/1
10 TABLET ORAL DAILY
Qty: 90 TABLET | Refills: 1 | Status: SHIPPED | OUTPATIENT
Start: 2023-10-23

## 2023-10-23 NOTE — ASSESSMENT & PLAN NOTE
Reports that he is having a lot of increased stress at work. This will often cause headaches. Is able to take OTC pain medication with some relief. He has noticed that he will sometimes have mood lability and take his anger out on his wife. Will usually take medication about 2x weekly.

## 2023-10-23 NOTE — NON-PROVIDER
Subjective:     CC:    Chief Complaint   Patient presents with    Establish Care    Sinus Problem     X 2 months  headache , ear ache , stuffy   Hot and cold flashes        HISTORY OF THE PRESENT ILLNESS: Patient is a 48 y.o. male. This pleasant patient is here today to establish care and discuss the following:    -Reviewed all past medical history, family history, social history.  -Reviewed all screening/vaccinations: declines vaccinations today  -Tobacco, alcohol, recreational drug use:  reports that he quit smoking about 23 years ago. His smoking use included cigarettes. He started smoking about 27 years ago. He has a 1.0 pack-year smoking history. He has never used smokeless tobacco.  -Sexually active:  reports being sexually active and has had partner(s) who are female.   -Occupation:  fork      Sinus problems:  Has had symptoms for a long time. It used to be once a year but now it is almost constant and has been the worst the last two months. Has been taking a nasal decongestant which has helped but has not taken away the symptoms completely. He tried a Z-pack, augmentin, and naprosyn which helped for a couple weeks and then it returned. Has tried the benadryl which helps for a little bit and then goes away. He cannot take steroids (in the past he had sudden vision loss which his eye doctor said was caused by having a steroid injection). His neighbor has many cats and he thinks it might be related.   Reports: congestion, L ear pain, hot and cold flashes. Fever last week for 2 days. Headaches when the pressure flares up.   Denies: rhinorrhea, sore throat, itchiness, cough, eye redness/itchiness/drainage.     Hypertension  Stable. Currently taking amlodipine. Usually takes it every day but he forgot to take it today.   He is not taking baby aspirin daily.   He is monitoring BP at home. 120-140s/80-90s.  Denies symptoms low BP: light-headed, tunnel-vision, unusual fatigue.   Denies symptoms high BP:  "palpitations, flushed face.   Reports: headache, blurriness when he has headaches  Denies medicine side effects: unusual fatigue, slow heartbeat, foot/leg swelling, cough.    Stress Reaction:  Reports that he is having a lot of increased stress at work. This will often cause headaches. Is able to take OTC pain medication with some relief. He has noticed he will sometime shave mood lability and take his anger out on his wife. Will usually take medication about 2x weekly. 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    ROS:   Gen: no changes in weight  ENT: no sore throat, no hearing loss, no bloody nose  Pulm: no sob, no cough  CV: no chest pain, no palpitations  GI: no nausea/vomiting, no diarrhea  : no dysuria  MSk: no myalgias  Skin: no rash  Neuro: no headaches, no numbness/tingling  Heme/Lymph: no easy bruising        - NOTE: All other systems reviewed and are negative, except as in HPI.      Objective:     Exam: BP (!) 164/96 (BP Location: Right arm, Patient Position: Sitting, BP Cuff Size: Adult)   Pulse 90   Temp 36.4 °C (97.5 °F)   Resp 14   Ht 1.727 m (5' 8\")   Wt 113 kg (249 lb 9.6 oz)   SpO2 95%  Body mass index is 37.95 kg/m².    General: Normal appearing. No distress.  HEENT: Normocephalic. Eyes conjunctiva clear lids without ptosis, pupils equal and reactive to light accommodation, ears normal shape and contour, canals are erythematous bilaterally, tympanic membranes are benign, nasal turbinates erythematous and edematous bilaterally, oropharynx erythematous, without edema or exudates.   Lymph: Submandibular lymphadenopathy bilaterally   Constitutional: Alert, no distress, well-groomed.  Respiratory: Unlabored respiratory effort, no cough.  MSK: Normal gait, moves all extremities.  Neuro: Grossly " non-focal.   Psych: Alert and oriented x3, normal affect and mood.     Labs: ordered    Assessment & Plan:   48 y.o. male with the following -    1. Hypertension, uncontrolled  - Monitors BP at home and usually has -140s/80-90s. Forgot to take him amlodipine today but usually takes it daily and denies forgetting to take it. Encouraged pt to continue checking his BP at home and to notify clinician if he is getting multiple readings >140/90. He is open to adding a BP medication for improved control if BP remains elevated at subsequent visit.   - amLODIPine (NORVASC) 10 MG Tab; Take 1 Tablet by mouth every day.  Dispense: 90 Tablet; Refill: 1    2. Sinus pressure  - This problem is chronic, and is poorly controlled on the current regimen. Pt reports improvement in symptoms for 2 weeks after taking Augmentin for similar symptoms. Will try course of doxycyline to treat any underlying bacterial sinusitis. Discussed trial of OTC non drowsy allergy medication daily for symptom control. Uses Neti pot at home; continue PRN for symptoms. States he cannot take any type of steroid due to reaction causing permanent vision loss. Will refer to ENT for further evaluation. Declined sinus CT at this time.   - Referral to ENT  - doxycycline (VIBRAMYCIN) 100 MG Tab; Take 1 Tablet by mouth 2 times a day.  Dispense: 20 Tablet; Refill: 0    3. Chronic pansinusitis  - Plan as above  - Referral to ENT  - doxycycline (VIBRAMYCIN) 100 MG Tab; Take 1 Tablet by mouth 2 times a day.  Dispense: 20 Tablet; Refill: 0    4. Stress reaction  - Pt currently involved with HR to resolve issues with co-worker. This is having a substantial negative impact on his life. Pt would like to consider using FMLA leave in the future if situation and stress do not improve.   - Is having h/a's he relates to increased stress which resolve with OTC medication. Okay to continue taking naprosyn as needed. If his h/a's increase in frequency he will contact the  office to consider h/a prevention regimen.     5. Encounter to establish care  -Reviewed all past medical history, family history, social history.  -Reviewed all screening/vaccinations:

## 2023-10-24 NOTE — PROGRESS NOTES
I saw the patient with Bibi Gagnon, student. I performed a physical exam and medical decision making. I reviewed and verified the documentation on 10/23/23 and agree with the content and plan as written by the student.

## 2023-12-06 ENCOUNTER — OFFICE VISIT (OUTPATIENT)
Dept: MEDICAL GROUP | Facility: LAB | Age: 48
End: 2023-12-06
Payer: COMMERCIAL

## 2023-12-06 VITALS
HEART RATE: 80 BPM | RESPIRATION RATE: 14 BRPM | SYSTOLIC BLOOD PRESSURE: 150 MMHG | BODY MASS INDEX: 37.86 KG/M2 | HEIGHT: 68 IN | DIASTOLIC BLOOD PRESSURE: 86 MMHG | TEMPERATURE: 97.2 F | OXYGEN SATURATION: 95 % | WEIGHT: 249.8 LBS

## 2023-12-06 DIAGNOSIS — I10 PRIMARY HYPERTENSION: ICD-10-CM

## 2023-12-06 DIAGNOSIS — F43.0 STRESS REACTION: ICD-10-CM

## 2023-12-06 DIAGNOSIS — J32.9 RECURRENT SINUSITIS: ICD-10-CM

## 2023-12-06 PROCEDURE — 3079F DIAST BP 80-89 MM HG: CPT | Performed by: NURSE PRACTITIONER

## 2023-12-06 PROCEDURE — 3077F SYST BP >= 140 MM HG: CPT | Performed by: NURSE PRACTITIONER

## 2023-12-06 PROCEDURE — 99214 OFFICE O/P EST MOD 30 MIN: CPT | Performed by: NURSE PRACTITIONER

## 2023-12-06 RX ORDER — LOSARTAN POTASSIUM 50 MG/1
50 TABLET ORAL DAILY
Qty: 30 TABLET | Refills: 6 | Status: SHIPPED | OUTPATIENT
Start: 2023-12-06

## 2023-12-06 RX ORDER — PROPRANOLOL HYDROCHLORIDE 10 MG/1
10 TABLET ORAL 2 TIMES DAILY PRN
Qty: 60 TABLET | Refills: 3 | Status: SHIPPED | OUTPATIENT
Start: 2023-12-06

## 2023-12-07 NOTE — ASSESSMENT & PLAN NOTE
Was seen previously for the same. Was prescribed abx at last appointment and reports that his symptoms did improve, but recurred as soon as he stopped the medication. He was also previously referred to ENT, will need to call to schedule an appointment.   Reports: congestion, L ear pain, hot and cold flashes. Fever last week for 2 days. Headaches when the pressure flares up.   Denies: rhinorrhea, sore throat, itchiness, cough, eye redness/itchiness/drainage.

## 2023-12-07 NOTE — PROGRESS NOTES
"Subjective:     CC:   Chief Complaint   Patient presents with    Hypertension Follow-up    Earache     L ear / sinus pressure     Headache     Nausea      HPI:   Guilherme presents today with the following:    Recurrent sinusitis  Was seen previously for the same. Was prescribed abx at last appointment and reports that his symptoms did improve, but recurred as soon as he stopped the medication. He was also previously referred to ENT, will need to call to schedule an appointment.   Reports: congestion, L ear pain, hot and cold flashes. Fever last week for 2 days. Headaches when the pressure flares up.   Denies: rhinorrhea, sore throat, itchiness, cough, eye redness/itchiness/drainage.     Hypertension  Chronic, uncontrolled condition.   Currently taking amlodipine 10 mg daily as directed.   He is not taking baby aspirin daily.   He is monitoring BP at home. Has been elevated in the 160s/90s.  Denies symptoms low BP: light-headed, tunnel-vision, unusual fatigue.   Denies symptoms high BP:pounding headache, visual changes, palpitations, flushed face.   Denies medicine side effects: unusual fatigue, slow heartbeat, foot/leg swelling, cough.    Stress reaction  Chronic condition. Was see previously for the same. Continues to have a lot of increased stress at work. This will often cause headaches and will increase his blood pressure. Is able to take OTC pain medication with some relief. He has noticed that he will sometimes have mood lability and take his anger out on his wife.    ROS:   As documented in history of present illness above    Objective:     Exam: BP (!) 164/88 (BP Location: Right arm, Patient Position: Sitting, BP Cuff Size: Large adult)   Pulse 80   Temp 36.2 °C (97.2 °F)   Resp 14   Ht 1.727 m (5' 8\")   Wt 113 kg (249 lb 12.8 oz)   SpO2 95%  Body mass index is 37.98 kg/m².    Constitutional: Alert, no distress, well-groomed.  Skin: Warm, dry, good turgor, no rashes in visible areas.  Eye: Equal, round " and reactive, conjunctiva clear, lids normal.  ENMT: Lips without lesions, good dentition, moist mucous membranes.  Neck: Trachea midline, no masses, no thyromegaly.  Respiratory: Unlabored respiratory effort, no cough.  MSK: Normal gait, moves all extremities.  Neuro: Grossly non-focal.   Psych: Alert and oriented x3, normal affect and mood.    Assessment & Plan:     48 y.o. male with the following -     1. Recurrent sinusitis  Referral previously placed to ENT, patient will call to schedule an appointment. Flonase as directed. Sleep with HOB elevated, humidifier at night, rest, increase fluid intake. Supportive care, differential diagnoses, and indications for immediate follow-up discussed with patient. Pathogenesis of diagnosis discussed including typical length and natural progression. Instructed to return to clinic for any change in condition, further concerns, or worsening of symptoms. Patient states understanding of the plan of care and discharge instructions.    2. Primary hypertension  Chronic, uncontrolled condition. Patient to take medication as prescribed. Side effects of medication prescribed today were discussed with the patient including how to take the medication and proper dosage. Discussed repercussions of not taking the medication as prescribed. Instructed to call the office should he have any negative side effects or problems with the medication. Labs as indicated. Discussed decreasing salt intake.  Emphasized benefits of exercise and diet.  - losartan (COZAAR) 50 MG Tab; Take 1 Tablet by mouth every day.  Dispense: 30 Tablet; Refill: 6    3. Stress reaction  Chronic, uncontrolled condition. Patient to take medication as prescribed. Side effects of medication prescribed today were discussed with the patient including how to take the medication and proper dosage. Discussed repercussions of not taking the medication as prescribed. Instructed to call the office should he have any negative side  effects or problems with the medication. Denies any suicidal or homicidal ideation. Discussed that should the patient have any symptoms they should call suicide prevention hotline or report to the emergency room immediately. Emphasized importance of healthy diet and exercise.    - propranolol (INDERAL) 10 MG Tab; Take 1 Tablet by mouth 2 times a day as needed (anxiety).  Dispense: 60 Tablet; Refill: 3

## 2023-12-07 NOTE — ASSESSMENT & PLAN NOTE
Chronic condition. Was see previously for the same. Continues to have a lot of increased stress at work. This will often cause headaches and will increase his blood pressure. Is able to take OTC pain medication with some relief. He has noticed that he will sometimes have mood lability and take his anger out on his wife.

## 2023-12-07 NOTE — ASSESSMENT & PLAN NOTE
Chronic, uncontrolled condition.   Currently taking amlodipine 10 mg daily as directed.   He is not taking baby aspirin daily.   He is monitoring BP at home. Has been elevated in the 160s/90s.  Denies symptoms low BP: light-headed, tunnel-vision, unusual fatigue.   Denies symptoms high BP:pounding headache, visual changes, palpitations, flushed face.   Denies medicine side effects: unusual fatigue, slow heartbeat, foot/leg swelling, cough.

## 2023-12-20 ENCOUNTER — OFFICE VISIT (OUTPATIENT)
Dept: URGENT CARE | Facility: PHYSICIAN GROUP | Age: 48
End: 2023-12-20
Payer: COMMERCIAL

## 2023-12-20 DIAGNOSIS — J11.1 INFLUENZA: ICD-10-CM

## 2023-12-20 DIAGNOSIS — R68.89 FLU-LIKE SYMPTOMS: ICD-10-CM

## 2023-12-20 DIAGNOSIS — J02.9 SORE THROAT: ICD-10-CM

## 2023-12-20 LAB
FLUAV RNA SPEC QL NAA+PROBE: POSITIVE
FLUBV RNA SPEC QL NAA+PROBE: NEGATIVE
RSV RNA SPEC QL NAA+PROBE: NEGATIVE
S PYO DNA SPEC NAA+PROBE: NOT DETECTED
SARS-COV-2 RNA RESP QL NAA+PROBE: NEGATIVE

## 2023-12-20 PROCEDURE — 99213 OFFICE O/P EST LOW 20 MIN: CPT | Performed by: NURSE PRACTITIONER

## 2023-12-20 PROCEDURE — 87651 STREP A DNA AMP PROBE: CPT | Performed by: NURSE PRACTITIONER

## 2023-12-20 PROCEDURE — 0241U POCT CEPHEID COV-2, FLU A/B, RSV - PCR: CPT | Performed by: NURSE PRACTITIONER

## 2023-12-20 PROCEDURE — 3079F DIAST BP 80-89 MM HG: CPT | Performed by: NURSE PRACTITIONER

## 2023-12-20 PROCEDURE — 3075F SYST BP GE 130 - 139MM HG: CPT | Performed by: NURSE PRACTITIONER

## 2023-12-20 RX ORDER — OSELTAMIVIR PHOSPHATE 75 MG/1
75 CAPSULE ORAL 2 TIMES DAILY
Qty: 10 CAPSULE | Refills: 0 | Status: SHIPPED | OUTPATIENT
Start: 2023-12-20

## 2023-12-20 NOTE — LETTER
December 20, 2023       Patient: Guilherme Williamson   YOB: 1975   Date of Visit: 12/20/2023         To Whom It May Concern:    In my medical opinion, I recommend that Guilherme Williamson be excused from work 12/20/2023 and 12/21/2023 due to illness.     If you have any questions or concerns, please don't hesitate to call [phone]          Sincerely,          [email or web address] [email or web address].  Electronically Signed

## 2023-12-25 VITALS
RESPIRATION RATE: 14 BRPM | SYSTOLIC BLOOD PRESSURE: 138 MMHG | OXYGEN SATURATION: 97 % | TEMPERATURE: 99.1 F | BODY MASS INDEX: 37.74 KG/M2 | HEIGHT: 68 IN | DIASTOLIC BLOOD PRESSURE: 88 MMHG | WEIGHT: 249 LBS | HEART RATE: 98 BPM

## 2023-12-25 ASSESSMENT — ENCOUNTER SYMPTOMS
CHILLS: 1
HEMOPTYSIS: 0
WHEEZING: 0
FEVER: 1
RHINORRHEA: 1
SORE THROAT: 1
MYALGIAS: 1
SHORTNESS OF BREATH: 0
HEADACHES: 1
COUGH: 1
GASTROINTESTINAL NEGATIVE: 1
VOMITING: 0
EYES NEGATIVE: 1
CARDIOVASCULAR NEGATIVE: 1

## 2023-12-25 NOTE — PROGRESS NOTES
"Subjective:   Guilherme Williamson is a 48 y.o. male who presents for Sore Throat (X3 days: Sore throat, cough, feverish, L ear pain. )      URI   This is a new problem. Episode onset: 3 days. The problem has been rapidly improving. The maximum temperature recorded prior to his arrival was 101 - 101.9 F. The fever has been present for 1 to 2 days. Associated symptoms include congestion, coughing, headaches, rhinorrhea and a sore throat. Pertinent negatives include no ear pain, vomiting or wheezing. He has tried sleep, steam, NSAIDs, increased fluids and decongestant for the symptoms. The treatment provided mild relief.       Review of Systems   Constitutional:  Positive for chills, fever and malaise/fatigue.   HENT:  Positive for congestion, rhinorrhea and sore throat. Negative for ear pain.    Eyes: Negative.    Respiratory:  Positive for cough. Negative for hemoptysis, shortness of breath and wheezing.    Cardiovascular: Negative.    Gastrointestinal: Negative.  Negative for vomiting.   Genitourinary: Negative.    Musculoskeletal:  Positive for myalgias.   Skin: Negative.    Neurological:  Positive for headaches.       Medications, Allergies, and current problem list reviewed today in Epic.     Objective:     BP (!) 148/112   Pulse 98   Temp 37.3 °C (99.1 °F)   Resp 14   Ht 1.727 m (5' 8\")   Wt 113 kg (249 lb)   SpO2 97%     Physical Exam  Vitals reviewed.   Constitutional:       General: He is not in acute distress.     Appearance: Normal appearance. He is ill-appearing.   HENT:      Head: Normocephalic and atraumatic.      Right Ear: Tympanic membrane, ear canal and external ear normal.      Left Ear: Tympanic membrane, ear canal and external ear normal.      Nose: Congestion and rhinorrhea present.      Mouth/Throat:      Mouth: Mucous membranes are moist.      Pharynx: Uvula midline. Pharyngeal swelling and posterior oropharyngeal erythema present. No oropharyngeal exudate or uvula swelling.   Eyes:      " Extraocular Movements: Extraocular movements intact.      Conjunctiva/sclera: Conjunctivae normal.      Pupils: Pupils are equal, round, and reactive to light.   Cardiovascular:      Rate and Rhythm: Normal rate and regular rhythm.      Pulses: Normal pulses.      Heart sounds: Normal heart sounds.   Pulmonary:      Effort: Pulmonary effort is normal. No respiratory distress.      Breath sounds: Normal breath sounds. No wheezing, rhonchi or rales.   Abdominal:      General: Abdomen is flat. Bowel sounds are normal.      Palpations: Abdomen is soft.   Musculoskeletal:         General: Normal range of motion.      Cervical back: Normal range of motion and neck supple. No tenderness.   Lymphadenopathy:      Cervical: No cervical adenopathy.   Skin:     General: Skin is warm and dry.      Capillary Refill: Capillary refill takes less than 2 seconds.   Neurological:      General: No focal deficit present.      Mental Status: He is alert and oriented to person, place, and time.   Psychiatric:         Mood and Affect: Mood normal.         Behavior: Behavior normal.       Results for orders placed or performed in visit on 12/20/23   POCT GROUP A STREP, PCR   Result Value Ref Range    POC Group A Strep, PCR Not Detected Not Detected, Invalid   POCT CoV-2, Flu A/B, RSV by PCR   Result Value Ref Range    SARS-CoV-2 by PCR Negative Negative, Invalid    Influenza virus A RNA Positive (A) Negative, Invalid    Influenza virus B, PCR Negative Negative, Invalid    RSV, PCR Negative Negative, Invalid         Assessment/Plan:     Diagnosis and associated orders:     1. Influenza  oseltamivir (TAMIFLU) 75 MG Cap      2. Flu-like symptoms  POCT CoV-2, Flu A/B, RSV by PCR      3. Sore throat  POCT GROUP A STREP, PCR         Comments/MDM:     Advised patient symptoms are viral in etiology, recommend supportive care. Increased fluids and rest.  Recommend over-the-counter cold and flu medications and Tylenol and/or ibuprofen for symptomatic  relief and fevers.  Discussed use of nedi-pot, humidifier, and Flonase nasal spray as well.  Discussed good hand hygiene and ways to decrease spread of disease.  Follow-up with PCP return for reevaluation if symptoms persist/worsen.  Patient offered discharge instructions regarding flu.  The patient demonstrated a good understanding and agreed with the treatment plan.            Differential diagnosis, natural history, supportive care, and indications for immediate follow-up discussed.    Advised the patient to follow-up with the primary care physician for recheck, reevaluation, and consideration of further management.    Please note that this dictation was created using voice recognition software. I have made a reasonable attempt to correct obvious errors, but I expect that there are errors of grammar and possibly content that I did not discover before finalizing the note.    This note was electronically signed by KVNG Srivastava

## 2024-02-21 ENCOUNTER — HOSPITAL ENCOUNTER (EMERGENCY)
Facility: MEDICAL CENTER | Age: 49
End: 2024-02-21
Attending: EMERGENCY MEDICINE
Payer: COMMERCIAL

## 2024-02-21 VITALS
SYSTOLIC BLOOD PRESSURE: 161 MMHG | BODY MASS INDEX: 36.65 KG/M2 | RESPIRATION RATE: 16 BRPM | TEMPERATURE: 97.2 F | WEIGHT: 241.84 LBS | HEART RATE: 75 BPM | HEIGHT: 68 IN | OXYGEN SATURATION: 97 % | DIASTOLIC BLOOD PRESSURE: 89 MMHG

## 2024-02-21 DIAGNOSIS — R73.9 HYPERGLYCEMIA: ICD-10-CM

## 2024-02-21 DIAGNOSIS — J01.10 ACUTE FRONTAL SINUSITIS, RECURRENCE NOT SPECIFIED: ICD-10-CM

## 2024-02-21 DIAGNOSIS — H81.10 BENIGN PAROXYSMAL POSITIONAL VERTIGO, UNSPECIFIED LATERALITY: ICD-10-CM

## 2024-02-21 LAB
ANION GAP SERPL CALC-SCNC: 21 MMOL/L (ref 7–16)
BASOPHILS # BLD AUTO: 0.6 % (ref 0–1.8)
BASOPHILS # BLD: 0.04 K/UL (ref 0–0.12)
BUN SERPL-MCNC: 12 MG/DL (ref 8–22)
CALCIUM SERPL-MCNC: 9.2 MG/DL (ref 8.4–10.2)
CHLORIDE SERPL-SCNC: 98 MMOL/L (ref 96–112)
CO2 SERPL-SCNC: 18 MMOL/L (ref 20–33)
CREAT SERPL-MCNC: 0.82 MG/DL (ref 0.5–1.4)
EKG IMPRESSION: NORMAL
EOSINOPHIL # BLD AUTO: 0 K/UL (ref 0–0.51)
EOSINOPHIL NFR BLD: 0 % (ref 0–6.9)
ERYTHROCYTE [DISTWIDTH] IN BLOOD BY AUTOMATED COUNT: 38.6 FL (ref 35.9–50)
GFR SERPLBLD CREATININE-BSD FMLA CKD-EPI: 108 ML/MIN/1.73 M 2
GLUCOSE SERPL-MCNC: 406 MG/DL (ref 65–99)
HCT VFR BLD AUTO: 49.1 % (ref 42–52)
HGB BLD-MCNC: 17.9 G/DL (ref 14–18)
IMM GRANULOCYTES # BLD AUTO: 0.03 K/UL (ref 0–0.11)
IMM GRANULOCYTES NFR BLD AUTO: 0.5 % (ref 0–0.9)
LYMPHOCYTES # BLD AUTO: 1.03 K/UL (ref 1–4.8)
LYMPHOCYTES NFR BLD: 15.5 % (ref 22–41)
MCH RBC QN AUTO: 31.4 PG (ref 27–33)
MCHC RBC AUTO-ENTMCNC: 36.5 G/DL (ref 32.3–36.5)
MCV RBC AUTO: 86.1 FL (ref 81.4–97.8)
MONOCYTES # BLD AUTO: 0.23 K/UL (ref 0–0.85)
MONOCYTES NFR BLD AUTO: 3.5 % (ref 0–13.4)
NEUTROPHILS # BLD AUTO: 5.33 K/UL (ref 1.82–7.42)
NEUTROPHILS NFR BLD: 79.9 % (ref 44–72)
NRBC # BLD AUTO: 0 K/UL
NRBC BLD-RTO: 0 /100 WBC (ref 0–0.2)
PLATELET # BLD AUTO: 226 K/UL (ref 164–446)
PMV BLD AUTO: 10 FL (ref 9–12.9)
POTASSIUM SERPL-SCNC: 3.7 MMOL/L (ref 3.6–5.5)
RBC # BLD AUTO: 5.7 M/UL (ref 4.7–6.1)
SODIUM SERPL-SCNC: 137 MMOL/L (ref 135–145)
WBC # BLD AUTO: 6.7 K/UL (ref 4.8–10.8)

## 2024-02-21 PROCEDURE — 96374 THER/PROPH/DIAG INJ IV PUSH: CPT

## 2024-02-21 PROCEDURE — 700102 HCHG RX REV CODE 250 W/ 637 OVERRIDE(OP): Performed by: EMERGENCY MEDICINE

## 2024-02-21 PROCEDURE — A9270 NON-COVERED ITEM OR SERVICE: HCPCS | Performed by: EMERGENCY MEDICINE

## 2024-02-21 PROCEDURE — 80048 BASIC METABOLIC PNL TOTAL CA: CPT

## 2024-02-21 PROCEDURE — 36415 COLL VENOUS BLD VENIPUNCTURE: CPT

## 2024-02-21 PROCEDURE — 85025 COMPLETE CBC W/AUTO DIFF WBC: CPT

## 2024-02-21 PROCEDURE — 700111 HCHG RX REV CODE 636 W/ 250 OVERRIDE (IP): Mod: JZ | Performed by: EMERGENCY MEDICINE

## 2024-02-21 PROCEDURE — 99284 EMERGENCY DEPT VISIT MOD MDM: CPT

## 2024-02-21 PROCEDURE — 93005 ELECTROCARDIOGRAM TRACING: CPT | Performed by: EMERGENCY MEDICINE

## 2024-02-21 RX ORDER — DOXYCYCLINE 100 MG/1
100 CAPSULE ORAL 2 TIMES DAILY
Qty: 14 CAPSULE | Refills: 0 | Status: ACTIVE | OUTPATIENT
Start: 2024-02-21 | End: 2024-02-28

## 2024-02-21 RX ORDER — MECLIZINE HYDROCHLORIDE 25 MG/1
25 TABLET ORAL 3 TIMES DAILY PRN
Qty: 30 TABLET | Refills: 0 | Status: SHIPPED | OUTPATIENT
Start: 2024-02-21

## 2024-02-21 RX ORDER — ONDANSETRON 2 MG/ML
4 INJECTION INTRAMUSCULAR; INTRAVENOUS ONCE
Status: COMPLETED | OUTPATIENT
Start: 2024-02-21 | End: 2024-02-21

## 2024-02-21 RX ORDER — MECLIZINE HYDROCHLORIDE 25 MG/1
25 TABLET ORAL ONCE
Status: COMPLETED | OUTPATIENT
Start: 2024-02-21 | End: 2024-02-21

## 2024-02-21 RX ORDER — ONDANSETRON 4 MG/1
4 TABLET, ORALLY DISINTEGRATING ORAL EVERY 6 HOURS PRN
Qty: 10 TABLET | Refills: 0 | Status: SHIPPED | OUTPATIENT
Start: 2024-02-21 | End: 2024-02-26

## 2024-02-21 RX ADMIN — ONDANSETRON 4 MG: 2 INJECTION INTRAMUSCULAR; INTRAVENOUS at 08:43

## 2024-02-21 RX ADMIN — MECLIZINE HYDROCHLORIDE 25 MG: 25 TABLET ORAL at 08:45

## 2024-02-21 NOTE — ED NOTES
Pt medicated as directed by ER md, Iv placed , labs drawn and taken to lab. poc update given to pt, results pending at this time

## 2024-02-21 NOTE — ED PROVIDER NOTES
ER Provider Note    Scribed for Dr. Isaiah Manrique M.D. by Tricia Blum. 2/21/2024  8:19 AM    Primary Care Provider: TAYLOR Mills    CHIEF COMPLAINT  Nausea and vomiting  -Dizziness, Congestion    EXTERNAL RECORDS REVIEWED  Outpatient Notes The patient was seen in urgent care December 2023 for a sore throat, left ear pain, and cough. He tested positive for the flu.       [facility]    Guilherme Williamson is a 49 y.o. male who presents to the ED for vomiting onset last night. The patient reports that he went to the movies last night and began to feel increasingly dizzy. After returning home he began to feel nauseous and started vomiting. He states that he has been unable to keep anything down since last night including his blood pressure medication. He has associated headache, left ear pain, congestion, sinus pressure, and sensation of room spinning. His dizziness is exacerbated by head movement more so on the left side. He notes that he has had vertigo in the past. He adds that he has had sinus issues for the past 3 weeks and has tried to take Tylenol and a nasal decongestant with little alleviation. There are no known alleviating or exacerbating factors.     PAST MEDICAL HISTORY  Past Medical History:   Diagnosis Date    Contusion of rib, left, subsequent encounter 1/11/2022    Hypertension     Tinea cruris 8/4/2014    Viral upper respiratory tract infection 6/15/2022       SURGICAL HISTORY  No past surgical history noted.    FAMILY HISTORY  Family History   Problem Relation Age of Onset    Stroke Father     Hypertension Father     Diabetes Paternal Grandmother     Stroke Paternal Grandfather     Cancer Neg Hx     Heart Disease Neg Hx        SOCIAL HISTORY   reports that he quit smoking about 23 years ago. His smoking use included cigarettes. He started smoking about 27 years ago. He has a 1 pack-year smoking history. He has never used smokeless tobacco. He reports current alcohol use. He reports that he  "does not use drugs.    CURRENT MEDICATIONS  Current Outpatient Medications   Medication Instructions    amLODIPine (NORVASC) 10 mg, Oral, DAILY    losartan (COZAAR) 50 mg, Oral, DAILY    oseltamivir (TAMIFLU) 75 mg, Oral, 2 TIMES DAILY    propranolol (INDERAL) 10 mg, Oral, 2 TIMES DAILY PRN      ALLERGIES  Flonase [fluticasone] and Other drug    PHYSICAL EXAM  BP (!) 164/112   Pulse 74   Temp 36.1 °C (97 °F) (Temporal)   Resp 14   Ht 1.727 m (5' 8\")   Wt 110 kg (241 lb 13.5 oz)   SpO2 97%   BMI 36.77 kg/m²   Constitutional: Alert in mild distress.  HENT: No signs of trauma, frontal sinus tenderness to palpation, Bilateral external ears normal, Nose normal.   Eyes: No nystagmus, Pupils are equal and reactive, Conjunctiva normal, Non-icteric.   Neck: Normal range of motion, No tenderness, Supple,   Cardiovascular: Regular rate and rhythm, no murmurs.   Thorax & Lungs: Normal breath sounds, No respiratory distress, No wheezing, No chest tenderness.   Abdomen: Bowel sounds normal, Soft, No tenderness, No masses, No pulsatile masses. No peritoneal signs.  Skin: Warm, Dry, No erythema, No rash.   Back: No bony tenderness, No CVA tenderness.   Extremities: No edema, No tenderness, No cyanosis, no tenderness  Neurologic: Left side symptoms of dizziness are worse than right, Symmetric smile, eyes shut tight bilaterally, forehead wrinkles bilaterally, sensation intact to light touch bilateral face, tongue midline, head turn and shoulder shrug with full strength. Hearing intact grossly bilaterally. 5/5  strength bilaterally, 5/5 tricep and bicep strength bilaterally. Sensation intact to light touch r, m, u, axillary nerves bilaterally. 5/5 strength quadricep, plantarflexion/dorsiflexion/extensor hallicus longus bilaterally. Sensation intact to light touch bilateral lower extremities in all nerve distributions. Intact finger-to-nose, gait normal.    Psychiatric: Affect normal     DIAGNOSTIC STUDIES & " PROCEDURES    Labs:   Labs Reviewed   CBC WITH DIFFERENTIAL - Abnormal; Notable for the following components:       Result Value    Neutrophils-Polys 79.90 (*)     Lymphocytes 15.50 (*)     All other components within normal limits   BASIC METABOLIC PANEL - Abnormal; Notable for the following components:    Co2 18 (*)     Glucose 406 (*)     Anion Gap 21.0 (*)     All other components within normal limits   ESTIMATED GFR    All labs reviewed by me.    EKG Interpretation:  Interpreted by me  12 Lead EKG interpreted by me to show:  Normal sinus rhythm  Rate 73  Mildly elevated QTC    COURSE & MEDICAL DECISION MAKING    ED Observation Status? No; Patient does not meet criteria for ED Observation.     INITIAL ASSESSMENT AND PLAN  Care Narrative:       8:19 AM - Patient seen and evaluated at bedside. Discussed plan of care, including labs. Patient agrees to plan of care. Patient will be treated with 4 mg Zofran and 25 mg Antivert for his symptoms. Ordered CBC with diff, BMP, and EKG to evaluate.    9:21 AM - Patient was reevaluated at bedside. The patient is feeling improved. Discussed lab results with the patient. Discussed plan for discharge, including plan for follow-up, and informed them to return to the [facility] ED with any new or worsening symptoms. Patient was given the opportunity for questions, and I addressed all questions or concerns. He is stable for discharge at this time. Patient verbalizes understanding and support with my plan for discharge.     HTN/IDDM FOLLOW UP:  The patient has known hypertension and is being followed by their primary care doctor    ADDITIONAL PROBLEM LIST AND DISPOSITION  Patient presents with symptoms of vertigo.  Ultimately the patient is also having congestion and there is concern for sinusitis and given this has been 3 weeks we will need to treat him for this.  His TMs are normal.  Neuroexam is normal.  I did perform the Marcos-Hallpike maneuver which seemed to help some.  Medications  were given as well.  EKG is normal other than a mildly elevated QTc.  Patient does have elevated glucose of 406.  Patient feeling significantly improved after meclizine.  At this time I will discharge the patient home with strict return precautions and follow-up.  I do not believe this is stroke.  There is no focal neurologic findings.    There is an element of potential food poisoning along with this but no nontender abdominal exam.               DISPOSITION AND DISCUSSIONS  I have discussed management of the patient with the following physicians and СВЕТЛАНА's: None    Discussion of management with other [facility] or appropriate source(s): None     Escalation of care considered, and ultimately not performed: acute inpatient care management, however at this time, the patient is most appropriate for outpatient management.    Barriers to care at this time, including but not limited to:  None .     Decision tools and prescription drugs considered including, but not limited to: Medication modification given medications for vertigo and nausea. .  The patient will return for new or worsening symptoms and is stable at the time of discharge.    DISPOSITION:  Patient will be discharged home in stable condition.    FOLLOW UP:  Yelena Leal, A.P.R.N.  [address] [address] [address]  [phone]    In 2 days        OUTPATIENT MEDICATIONS:  New Prescriptions    DOXYCYCLINE (MONODOX) 100 MG CAPSULE    Take 1 Capsule by mouth 2 times a day for 7 days.    MECLIZINE (ANTIVERT) 25 MG TAB    Take 1 Tablet by mouth 3 times a day as needed for Nausea/Vomiting or Vertigo.    ONDANSETRON (ZOFRAN ODT) 4 MG TABLET DISPERSIBLE    Take 1 Tablet by mouth every 6 hours as needed for Nausea/Vomiting for up to 5 days.        FINAL IMPRESSION   1. Benign paroxysmal positional vertigo, unspecified laterality    2. Acute frontal sinusitis, recurrence not specified         ITricia (Scribkendra), am scribing for, and in the presence  ofIsaiah M.D..    Electronically signed by: Tricia Blum (Scribe), 2/21/2024    IIsaiah M.D. personally performed the services described in this documentation, as scribed by Tricia Blum in my presence, and it is both accurate and complete.    The note accurately reflects work and decisions made by me.  Isaiah Manrique M.D.  2/21/2024  2:01 PM

## 2024-02-21 NOTE — ED NOTES
Pt states he has vertigo, pressure between his eyes, and it feels like his left ear is going to pop. Pt states symptoms started yesterday around 20:00. Pt states he has abdominal pain and cramping as well. Pt states everything is spinning.

## 2024-02-26 ENCOUNTER — OFFICE VISIT (OUTPATIENT)
Dept: URGENT CARE | Facility: CLINIC | Age: 49
End: 2024-02-26
Payer: COMMERCIAL

## 2024-02-26 VITALS
SYSTOLIC BLOOD PRESSURE: 146 MMHG | HEART RATE: 74 BPM | DIASTOLIC BLOOD PRESSURE: 92 MMHG | RESPIRATION RATE: 20 BRPM | WEIGHT: 243.1 LBS | OXYGEN SATURATION: 98 % | BODY MASS INDEX: 36.84 KG/M2 | TEMPERATURE: 97.7 F | HEIGHT: 68 IN

## 2024-02-26 DIAGNOSIS — H92.02 OTALGIA, LEFT: ICD-10-CM

## 2024-02-26 DIAGNOSIS — I10 HYPERTENSION, UNSPECIFIED TYPE: ICD-10-CM

## 2024-02-26 DIAGNOSIS — J01.11 ACUTE RECURRENT FRONTAL SINUSITIS: ICD-10-CM

## 2024-02-26 PROCEDURE — 99214 OFFICE O/P EST MOD 30 MIN: CPT | Performed by: NURSE PRACTITIONER

## 2024-02-26 PROCEDURE — 3080F DIAST BP >= 90 MM HG: CPT | Performed by: NURSE PRACTITIONER

## 2024-02-26 PROCEDURE — 3077F SYST BP >= 140 MM HG: CPT | Performed by: NURSE PRACTITIONER

## 2024-02-26 RX ORDER — AMOXICILLIN AND CLAVULANATE POTASSIUM 875; 125 MG/1; MG/1
1 TABLET, FILM COATED ORAL 2 TIMES DAILY
Qty: 14 TABLET | Refills: 0 | Status: SHIPPED | OUTPATIENT
Start: 2024-02-26 | End: 2024-03-04

## 2024-02-26 RX ORDER — IBUPROFEN 800 MG/1
800 TABLET ORAL EVERY 8 HOURS PRN
Qty: 30 TABLET | Refills: 0 | Status: SHIPPED | OUTPATIENT
Start: 2024-02-26 | End: 2024-02-27

## 2024-02-26 ASSESSMENT — ENCOUNTER SYMPTOMS
COUGH: 0
SORE THROAT: 0
SINUS PAIN: 1
SINUS PRESSURE: 1
HEADACHES: 1
FEVER: 0
CHILLS: 0

## 2024-02-26 NOTE — LETTER
February 26, 2024         Patient: Guilherme Williamson   YOB: 1975   Date of Visit: 2/26/2024           To Whom it May Concern:    Guilherme Williamson was seen in my clinic on 2/26/2024. He may return to work on 2/28/24.    If you have any questions or concerns, please don't hesitate to call.        Sincerely,           EVERETTE Tolentino.  Electronically Signed

## 2024-02-27 ENCOUNTER — OFFICE VISIT (OUTPATIENT)
Dept: MEDICAL GROUP | Facility: LAB | Age: 49
End: 2024-02-27
Payer: COMMERCIAL

## 2024-02-27 VITALS
HEART RATE: 90 BPM | DIASTOLIC BLOOD PRESSURE: 82 MMHG | RESPIRATION RATE: 14 BRPM | SYSTOLIC BLOOD PRESSURE: 126 MMHG | HEIGHT: 68 IN | BODY MASS INDEX: 36.95 KG/M2 | OXYGEN SATURATION: 94 % | TEMPERATURE: 98.1 F | WEIGHT: 243.8 LBS

## 2024-02-27 DIAGNOSIS — J32.9 RECURRENT SINUSITIS: ICD-10-CM

## 2024-02-27 DIAGNOSIS — I10 PRIMARY HYPERTENSION: ICD-10-CM

## 2024-02-27 DIAGNOSIS — J01.40 ACUTE NON-RECURRENT PANSINUSITIS: ICD-10-CM

## 2024-02-27 PROCEDURE — 99213 OFFICE O/P EST LOW 20 MIN: CPT | Performed by: NURSE PRACTITIONER

## 2024-02-27 PROCEDURE — 3074F SYST BP LT 130 MM HG: CPT | Performed by: NURSE PRACTITIONER

## 2024-02-27 PROCEDURE — 3079F DIAST BP 80-89 MM HG: CPT | Performed by: NURSE PRACTITIONER

## 2024-02-27 RX ORDER — AMOXICILLIN AND CLAVULANATE POTASSIUM 875; 125 MG/1; MG/1
1 TABLET, FILM COATED ORAL 2 TIMES DAILY
Qty: 14 TABLET | Refills: 0 | Status: SHIPPED | OUTPATIENT
Start: 2024-02-27 | End: 2024-03-05

## 2024-02-27 RX ORDER — NAPROXEN 500 MG/1
500 TABLET ORAL 2 TIMES DAILY WITH MEALS
Qty: 30 TABLET | Refills: 0 | Status: SHIPPED | OUTPATIENT
Start: 2024-02-27

## 2024-02-27 NOTE — ASSESSMENT & PLAN NOTE
Stable. Currently taking losartan 50 mg daily and amlodipine 10 mg daily as directed.   He is not taking baby aspirin daily.   He is monitoring BP at home.   Denies symptoms low BP: light-headed, tunnel-vision, unusual fatigue.   Denies symptoms high BP:pounding headache, visual changes, palpitations, flushed face.   Denies medicine side effects: unusual fatigue, slow heartbeat, foot/leg swelling, cough.

## 2024-02-27 NOTE — PROGRESS NOTES
"Subjective:     CC:   Chief Complaint   Patient presents with    Follow-Up     BP / ear infection sinus      HPI:   Guilherme presents today with the following:    Hypertension  Stable. Currently taking losartan 50 mg daily and amlodipine 10 mg daily as directed.   He is not taking baby aspirin daily.   He is monitoring BP at home.   Denies symptoms low BP: light-headed, tunnel-vision, unusual fatigue.   Denies symptoms high BP:pounding headache, visual changes, palpitations, flushed face.   Denies medicine side effects: unusual fatigue, slow heartbeat, foot/leg swelling, cough.    Sinusitis  Reports that he has had a sinus infection for the last 8 days. He was also seen at  yesterday for the same. Reports worsening sinus pain, symptoms of vertigo, headache, subjective fever. He was prescribed Augmentin, but had not started this yet. In the past he has had success with taking Naproxen and would like a refill at this time.    ROS:   As documented in history of present illness above    Objective:     Exam: /82 (BP Location: Right arm, Patient Position: Sitting, BP Cuff Size: Large adult)   Pulse 90   Temp 36.7 °C (98.1 °F)   Resp 14   Ht 1.727 m (5' 8\")   Wt 111 kg (243 lb 12.8 oz)   SpO2 94%  Body mass index is 37.07 kg/m².    Constitutional: Alert, no distress, well-groomed.  Skin: Warm, dry, good turgor, no rashes in visible areas.  Eye: Equal, round and reactive, conjunctiva clear, lids normal.  ENMT: Lips without lesions, good dentition, moist mucous membranes.  Neck: Trachea midline, no masses, no thyromegaly.  Respiratory: Unlabored respiratory effort, no cough.  MSK: Normal gait, moves all extremities.  Neuro: Grossly non-focal.   Psych: Alert and oriented x3, normal affect and mood.    Assessment & Plan:     49 y.o. male with the following -     1. Primary hypertension  Well-controlled on current regimen.  Labs as indicated.  Continue antihypertensive medications.  Discussed decreasing salt " intake.  Emphasized benefits of exercise and diet.    2. Recurrent sinusitis  Patient to take antibiotic as directed. Probiotic use encouraged. Flonase as directed. Patient can not use steroids of any kind due to eye problems. Will provide NSAID for inflammation, patient told to take this with an antihistamine. Sleep with HOB elevated, humidifier at night, rest, increase fluid intake. Supportive care, differential diagnoses, and indications for immediate follow-up discussed with patient. Pathogenesis of diagnosis discussed including typical length and natural progression. Instructed to return to clinic for any change in condition, further concerns, or worsening of symptoms. Patient states understanding of the plan of care and discharge instructions.

## 2024-02-27 NOTE — LETTER
February 27, 2024         Patient: Guilherme Williamson   YOB: 1975   Date of Visit: 2/27/2024           To Whom it May Concern:    Guilherme Williamson was seen in my clinic on 2/27/2024. He may return to work on 3/4/2024.    If you have any questions or concerns, please don't hesitate to call.        Sincerely,           EVERETTE Mills.  Electronically Signed

## 2024-02-27 NOTE — PROGRESS NOTES
Subjective:   Guilherme Williamson is a 49 y.o. male who presents for Sinusitis (Patient states that he thinks that he has a sinus infection and ear infection. States that he was seen at the ear for this. Patient states a lot of pressure on his head and the back of his head, States he feels week and tired. )      Sinusitis  This is a recurrent problem. The current episode started in the past 7 days. The problem has been gradually worsening since onset. Associated symptoms include congestion, ear pain, headaches and sinus pressure. Pertinent negatives include no chills, coughing or sore throat. Past treatments include acetaminophen and antibiotics. The treatment provided no relief.       Review of Systems   Constitutional:  Negative for chills, fever and malaise/fatigue.   HENT:  Positive for congestion, ear pain, sinus pressure and sinus pain. Negative for sore throat.    Respiratory:  Negative for cough.    Neurological:  Positive for headaches.       Medications:    amLODIPine Tabs  amoxicillin-clavulanate Tabs  doxycycline  ibuprofen Tabs  losartan Tabs  meclizine Tabs  ondansetron Tbdp  oseltamivir Caps  propranolol Tabs    Allergies: Flonase [fluticasone] and Other drug    Problem List: Guilherme Williamson does not have any pertinent problems on file.    Surgical History:  No past surgical history on file.    Past Social Hx: Guilherme Williamson  reports that he quit smoking about 23 years ago. His smoking use included cigarettes. He started smoking about 27 years ago. He has a 1 pack-year smoking history. He has never used smokeless tobacco. He reports current alcohol use. He reports that he does not use drugs.     Past Family Hx:  Guilherme Williamson family history includes Diabetes in his paternal grandmother; Hypertension in his father; Stroke in his father and paternal grandfather.     Problem list, medications, and allergies reviewed by myself today in Epic.     Objective:     BP (!) 146/92 (BP Location: Left arm,  "Patient Position: Sitting, BP Cuff Size: Adult)   Pulse 74   Temp 36.5 °C (97.7 °F) (Temporal)   Resp 20   Ht 1.727 m (5' 8\")   Wt 110 kg (243 lb 1.6 oz)   SpO2 98%   BMI 36.96 kg/m²     Physical Exam  Vitals and nursing note reviewed.   Constitutional:       General: He is not in acute distress.     Appearance: He is well-developed.   HENT:      Head: Normocephalic and atraumatic.      Right Ear: Tympanic membrane and external ear normal.      Left Ear: Tympanic membrane and external ear normal.      Nose:      Right Sinus: Frontal sinus tenderness present.      Left Sinus: Frontal sinus tenderness present.      Mouth/Throat:      Mouth: Mucous membranes are moist.   Eyes:      Conjunctiva/sclera: Conjunctivae normal.   Cardiovascular:      Rate and Rhythm: Normal rate.   Pulmonary:      Effort: Pulmonary effort is normal. No respiratory distress.      Breath sounds: Normal breath sounds.   Abdominal:      General: There is no distension.   Musculoskeletal:         General: Normal range of motion.   Skin:     General: Skin is warm and dry.   Neurological:      General: No focal deficit present.      Mental Status: He is alert and oriented to person, place, and time. Mental status is at baseline.      Gait: Gait (gait at baseline) normal.   Psychiatric:         Judgment: Judgment normal.         Assessment/Plan:     Diagnosis and associated orders:     1. Acute recurrent frontal sinusitis  amoxicillin-clavulanate (AUGMENTIN) 875-125 MG Tab    ibuprofen (MOTRIN) 800 MG Tab      2. Otalgia, left        3. Hypertension, unspecified type               Comments/MDM:     I personally reviewed prior external notes and prior test results pertinent to today's visit.  Patient previously treated with doxycycline having recurring sinus infection and persistent pain treat with Augmentin.  Patient does have a follow-up tomorrow with PCP.  Discussed management options, risks and benefits, and alternatives to treatment plan " agreed upon.   Red flags discussed and indications to immediately call 911 or present to the Emergency Department.   Supportive care, differential diagnoses, and indications for immediate follow-up discussed with patient.    Patient expresses understanding and agrees to plan. Patient denies any other questions or concerns.            =    Please note that this dictation was created using voice recognition software. I have made a reasonable attempt to correct obvious errors, but I expect that there are errors of grammar and possibly content that I did not discover before finalizing the note.    This note was electronically signed by Gordon CALDERON.

## 2024-04-10 ENCOUNTER — OFFICE VISIT (OUTPATIENT)
Dept: MEDICAL GROUP | Facility: LAB | Age: 49
End: 2024-04-10
Payer: COMMERCIAL

## 2024-04-10 VITALS
HEIGHT: 68 IN | BODY MASS INDEX: 37.74 KG/M2 | OXYGEN SATURATION: 97 % | WEIGHT: 249 LBS | SYSTOLIC BLOOD PRESSURE: 136 MMHG | RESPIRATION RATE: 14 BRPM | TEMPERATURE: 97.6 F | DIASTOLIC BLOOD PRESSURE: 68 MMHG | HEART RATE: 78 BPM

## 2024-04-10 DIAGNOSIS — F43.0 STRESS REACTION: ICD-10-CM

## 2024-04-10 DIAGNOSIS — I10 PRIMARY HYPERTENSION: ICD-10-CM

## 2024-04-10 DIAGNOSIS — H92.02 LEFT EAR PAIN: ICD-10-CM

## 2024-04-10 PROCEDURE — 3078F DIAST BP <80 MM HG: CPT | Performed by: NURSE PRACTITIONER

## 2024-04-10 PROCEDURE — 3075F SYST BP GE 130 - 139MM HG: CPT | Performed by: NURSE PRACTITIONER

## 2024-04-10 PROCEDURE — 99214 OFFICE O/P EST MOD 30 MIN: CPT | Performed by: NURSE PRACTITIONER

## 2024-04-10 RX ORDER — OFLOXACIN 3 MG/ML
5 SOLUTION AURICULAR (OTIC) DAILY
Qty: 10 ML | Refills: 0 | Status: SHIPPED | OUTPATIENT
Start: 2024-04-10

## 2024-04-11 NOTE — ASSESSMENT & PLAN NOTE
Chronic condition. Currently taking losartan 50 mg daily,  amlodipine 10 mg daily, and propranolol 10 mg BID PRN as directed.   He is not taking baby aspirin daily.   He is monitoring BP at home.   Denies symptoms low BP: light-headed, tunnel-vision, unusual fatigue.   Denies symptoms high BP:pounding headache, visual changes, palpitations, flushed face.   Denies medicine side effects: unusual fatigue, slow heartbeat, foot/leg swelling, cough.

## 2024-04-11 NOTE — ASSESSMENT & PLAN NOTE
Chronic condition. Was see previously for the same. Continues to have a lot of increased stress at work. This will often cause headaches and will increase his blood pressure. Also reports increased dizziness when his blood pressure is elevated. BP controlled today in office. Is able to take OTC pain medication with some relief. He has noticed that he will sometimes have mood lability and irritability. He is taking propranolol PRN. He is here today to complete [place] paperwork.

## 2024-04-11 NOTE — PROGRESS NOTES
"Subjective:     CC:   Chief Complaint   Patient presents with    Paperwork     [facility]     HPI:   Guilherme presents today with the following:    Stress reaction  Chronic condition. Was see previously for the same. Continues to have a lot of increased stress at work. This will often cause headaches and will increase his blood pressure. Also reports increased dizziness when his blood pressure is elevated. BP controlled today in office. Is able to take OTC pain medication with some relief. He has noticed that he will sometimes have mood lability and irritability. He is taking propranolol PRN. He is here today to complete [facility] paperwork.     Hypertension  Chronic condition. Currently taking losartan 50 mg daily,  amlodipine 10 mg daily, and propranolol 10 mg BID PRN as directed.   He is not taking baby aspirin daily.   He is monitoring BP at home.   Denies symptoms low BP: light-headed, tunnel-vision, unusual fatigue.   Denies symptoms high BP:pounding headache, visual changes, palpitations, flushed face.   Denies medicine side effects: unusual fatigue, slow heartbeat, foot/leg swelling, cough.    Otalgia  Patient has a history of frequent sinus infections. Was treated several months ago for sinusitis, but continues to have left ear pain. He cannot take steroids as this increases his intraocular pressure and he will have vision changes. Denies hearing loss, congestion, fever, chills, N/V.     ROS:   As documented in history of present illness above    Objective:     Exam: /68 (BP Location: Right arm, Patient Position: Sitting, BP Cuff Size: Large adult)   Pulse 78   Temp 36.4 °C (97.6 °F)   Resp 14   Ht 1.727 m (5' 8\")   Wt 113 kg (249 lb)   SpO2 97%  Body mass index is 37.86 kg/m².    Constitutional: Alert, no distress, well-groomed.  Skin: Warm, dry, good turgor, no rashes in visible areas.  HEENT: Normocephalic. Eyes conjunctiva clear lids without ptosis, pupils equal and reactive to light accommodation, ears " PNDS met, po per I&O sheet. Pt dressed, up in recliner and transported to Phase 2.    normal shape and contour, canals are clear bilaterally, tympanic membranes are benign.   Neck: Trachea midline, no masses, no thyromegaly.  Respiratory: Unlabored respiratory effort, no cough.  MSK: Normal gait, moves all extremities.  Neuro: Grossly non-focal.   Psych: Alert and oriented x3, normal affect and mood.    Assessment & Plan:     49 y.o. male with the following -     1. Left ear pain  Patient to use medication as prescribed. Should be getting better in a couple days, much better by 1 week and resolved by 2 weeks. If not better in 1-2 weeks, if getting worse, or new symptoms develop, discussed to return  Fever, chills, severe pain, headache, getting worse, advised to return.  - ofloxacin otic sol (FLOXIN OTIC) 0.3 % Solution; Administer 5 Drops into affected ear(s) every day.  Dispense: 10 mL; Refill: 0    2. Stress reaction  Chronic condition. Patient continues to have increased anxiety due to stress at work. This causes his blood pressure to become elevated. BP well-controlled today in office. [facility] paperwork completed today in office and original returned to patient. Patient to continue medications as prescribed. Denies any suicidal or homicidal ideation. Discussed that should the patient have any symptoms they should call suicide prevention hotline or report to the emergency room immediately. Emphasized importance of healthy diet and exercise.      3. Primary hypertension  Well-controlled on current regimen.  Labs as indicated.  Continue antihypertensive medications.  Discussed decreasing salt intake.  Emphasized benefits of exercise and diet.

## 2024-06-25 ENCOUNTER — OFFICE VISIT (OUTPATIENT)
Dept: URGENT CARE | Facility: PHYSICIAN GROUP | Age: 49
End: 2024-06-25
Payer: COMMERCIAL

## 2024-06-25 VITALS
HEIGHT: 68 IN | HEART RATE: 99 BPM | WEIGHT: 241 LBS | BODY MASS INDEX: 36.53 KG/M2 | OXYGEN SATURATION: 95 % | DIASTOLIC BLOOD PRESSURE: 100 MMHG | TEMPERATURE: 97.2 F | SYSTOLIC BLOOD PRESSURE: 142 MMHG

## 2024-06-25 DIAGNOSIS — I10 ELEVATED BLOOD PRESSURE READING IN OFFICE WITH DIAGNOSIS OF HYPERTENSION: ICD-10-CM

## 2024-06-25 DIAGNOSIS — J01.40 ACUTE NON-RECURRENT PANSINUSITIS: ICD-10-CM

## 2024-06-25 DIAGNOSIS — J30.89 NON-SEASONAL ALLERGIC RHINITIS DUE TO OTHER ALLERGIC TRIGGER: ICD-10-CM

## 2024-06-25 DIAGNOSIS — H92.02 OTALGIA, LEFT: ICD-10-CM

## 2024-06-25 PROCEDURE — 3080F DIAST BP >= 90 MM HG: CPT | Performed by: PHYSICIAN ASSISTANT

## 2024-06-25 PROCEDURE — 3077F SYST BP >= 140 MM HG: CPT | Performed by: PHYSICIAN ASSISTANT

## 2024-06-25 PROCEDURE — 99213 OFFICE O/P EST LOW 20 MIN: CPT | Performed by: PHYSICIAN ASSISTANT

## 2024-06-25 RX ORDER — AMOXICILLIN AND CLAVULANATE POTASSIUM 875; 125 MG/1; MG/1
1 TABLET, FILM COATED ORAL 2 TIMES DAILY
Qty: 14 TABLET | Refills: 0 | Status: SHIPPED | OUTPATIENT
Start: 2024-06-25 | End: 2024-07-02

## 2024-06-25 RX ORDER — AZELASTINE 1 MG/ML
1 SPRAY, METERED NASAL 2 TIMES DAILY
Qty: 30 ML | Refills: 2 | Status: SHIPPED | OUTPATIENT
Start: 2024-06-25

## 2024-06-25 NOTE — PROGRESS NOTES
"Subjective     Guilherme Williamson is a 49 y.o. male who presents with No chief complaint on file.            PT presents to clinic today with complaint of worsening sinus congestion, pain and pressure x 3 weeks.  PT states he feels symptoms started out as allergy type symptoms but has progressed into sinus infection.  Patient has had sinusitis in the past feels the same.  Patient has tried over-the-counter allergy medication, Flonase, DayQuil and NyQuil with no improvement in his symptoms.  No other complaints.    Sinus Problem  This is a new problem. The current episode started 1 to 4 weeks ago. The problem has been gradually worsening since onset. There has been no fever. Associated symptoms include congestion, coughing, ear pain, headaches and sinus pressure. Pertinent negatives include no chills, shortness of breath, sneezing or sore throat. Past treatments include acetaminophen and saline nose sprays. The treatment provided no relief.       Review of Systems   Constitutional:  Negative for chills and fever.   HENT:  Positive for congestion, ear pain, sinus pressure and sinus pain. Negative for ear discharge, sneezing and sore throat.    Respiratory:  Positive for cough. Negative for sputum production and shortness of breath.    Neurological:  Positive for headaches.   All other systems reviewed and are negative.             Objective     BP (!) 142/100 (BP Location: Right arm, Patient Position: Sitting, BP Cuff Size: Adult)   Pulse 99   Temp 36.2 °C (97.2 °F) (Temporal)   Ht 1.727 m (5' 8\")   Wt 109 kg (241 lb)   SpO2 95%   BMI 36.64 kg/m²      Physical Exam  Vitals and nursing note reviewed.   Constitutional:       General: He is not in acute distress.     Appearance: Normal appearance. He is well-developed. He is not ill-appearing or toxic-appearing.   HENT:      Head: Normocephalic and atraumatic.      Right Ear: Tympanic membrane normal.      Left Ear: Tympanic membrane normal.      Nose: Nasal " tenderness and mucosal edema present.      Right Sinus: Maxillary sinus tenderness present.      Left Sinus: Maxillary sinus tenderness present.      Mouth/Throat:      Lips: Pink.      Mouth: Mucous membranes are moist.      Pharynx: Uvula midline.   Eyes:      Extraocular Movements: Extraocular movements intact.      Conjunctiva/sclera: Conjunctivae normal.      Pupils: Pupils are equal, round, and reactive to light.   Cardiovascular:      Rate and Rhythm: Normal rate and regular rhythm.      Heart sounds: Normal heart sounds.   Pulmonary:      Effort: Pulmonary effort is normal.      Breath sounds: Normal breath sounds. No rales.   Abdominal:      Palpations: Abdomen is soft.   Musculoskeletal:         General: Normal range of motion.      Cervical back: Normal range of motion and neck supple.   Skin:     General: Skin is warm.      Capillary Refill: Capillary refill takes less than 2 seconds.   Neurological:      General: No focal deficit present.      Mental Status: He is alert and oriented to person, place, and time.      Gait: Gait normal.   Psychiatric:         Mood and Affect: Mood normal.         Behavior: Behavior is cooperative.                             Assessment & Plan        1. Acute non-recurrent pansinusitis  azelastine (ASTELIN) 137 MCG/SPRAY nasal spray    amoxicillin-clavulanate (AUGMENTIN) 875-125 MG Tab      2. Otalgia, left  azelastine (ASTELIN) 137 MCG/SPRAY nasal spray    amoxicillin-clavulanate (AUGMENTIN) 875-125 MG Tab      3. Non-seasonal allergic rhinitis due to other allergic trigger  azelastine (ASTELIN) 137 MCG/SPRAY nasal spray    amoxicillin-clavulanate (AUGMENTIN) 875-125 MG Tab      4. Elevated blood pressure reading in office with diagnosis of hypertension                     Patient HPI physical exam consistent with acute maxillary sinusitis, likely triggered from allergies that seem to be year-round for the patient.  I will treat with Augmentin twice daily x 10 days.  I  have also sent a prescription for azelastine nasal spray for patient to try for his allergies.    Patient does have an upcoming ear nose and throat consultation in a month or 2 he was unsure of the timeline but will keep that appointment.    PT can begin or continue OTC medications, increase fluids and rest until symptoms improve.     Differential diagnosis, supportive care, and indications for immediate follow-up discussed with patient.  Instructed to return to clinic or nearest emergency department for any change in condition, further concerns, or worsening of symptoms.    I personally reviewed prior external notes and test results pertinent to today's visit.  I have independently reviewed and interpreted all diagnostics ordered during this urgent care visit.    PT should follow up with PCP in 1-2 days for re-evaluation if symptoms have not improved.      Discussed red flags and reasons to return to UC or ED.      Pt and/or family verbalized understanding of diagnosis and follow up instructions and was offered informational handout on diagnosis.  PT discharged.     Please note that this dictation was created using voice recognition software. I have made every reasonable attempt to correct obvious errors, but I expect that there may be errors of grammar and possibly content that I did not discover before finalizing the note.

## 2024-06-26 ASSESSMENT — ENCOUNTER SYMPTOMS
SORE THROAT: 0
FEVER: 0
SPUTUM PRODUCTION: 0
SHORTNESS OF BREATH: 0
HEADACHES: 1
CHILLS: 0
SINUS PRESSURE: 1
COUGH: 1
SINUS PAIN: 1

## 2024-07-10 ENCOUNTER — OFFICE VISIT (OUTPATIENT)
Dept: MEDICAL GROUP | Facility: LAB | Age: 49
End: 2024-07-10
Payer: COMMERCIAL

## 2024-07-10 VITALS
WEIGHT: 244.4 LBS | OXYGEN SATURATION: 95 % | RESPIRATION RATE: 14 BRPM | SYSTOLIC BLOOD PRESSURE: 166 MMHG | TEMPERATURE: 97.8 F | HEIGHT: 68 IN | DIASTOLIC BLOOD PRESSURE: 84 MMHG | HEART RATE: 84 BPM | BODY MASS INDEX: 37.04 KG/M2

## 2024-07-10 DIAGNOSIS — R73.9 HYPERGLYCEMIA: ICD-10-CM

## 2024-07-10 DIAGNOSIS — I10 PRIMARY HYPERTENSION: ICD-10-CM

## 2024-07-10 DIAGNOSIS — E11.9 TYPE 2 DIABETES MELLITUS WITHOUT COMPLICATION, WITHOUT LONG-TERM CURRENT USE OF INSULIN (HCC): ICD-10-CM

## 2024-07-10 DIAGNOSIS — Z00.00 PREVENTATIVE HEALTH CARE: ICD-10-CM

## 2024-07-10 LAB
HBA1C MFR BLD: 9.9 % (ref ?–5.8)
POCT INT CON NEG: NEGATIVE
POCT INT CON POS: POSITIVE

## 2024-07-10 PROCEDURE — 99214 OFFICE O/P EST MOD 30 MIN: CPT | Performed by: NURSE PRACTITIONER

## 2024-07-10 PROCEDURE — 83036 HEMOGLOBIN GLYCOSYLATED A1C: CPT | Performed by: NURSE PRACTITIONER

## 2024-07-10 PROCEDURE — 3079F DIAST BP 80-89 MM HG: CPT | Performed by: NURSE PRACTITIONER

## 2024-07-10 PROCEDURE — 3077F SYST BP >= 140 MM HG: CPT | Performed by: NURSE PRACTITIONER

## 2024-07-10 RX ORDER — LOSARTAN POTASSIUM 100 MG/1
100 TABLET ORAL DAILY
Qty: 90 TABLET | Refills: 3 | Status: SHIPPED | OUTPATIENT
Start: 2024-07-10

## 2024-07-17 DIAGNOSIS — H92.02 OTALGIA, LEFT: ICD-10-CM

## 2024-07-17 DIAGNOSIS — J01.40 ACUTE NON-RECURRENT PANSINUSITIS: ICD-10-CM

## 2024-07-17 DIAGNOSIS — J30.89 NON-SEASONAL ALLERGIC RHINITIS DUE TO OTHER ALLERGIC TRIGGER: ICD-10-CM

## 2024-07-17 RX ORDER — AZELASTINE 1 MG/ML
1 SPRAY, METERED NASAL 2 TIMES DAILY
Qty: 30 ML | Refills: 2 | Status: SHIPPED | OUTPATIENT
Start: 2024-07-17

## 2024-08-06 ENCOUNTER — OFFICE VISIT (OUTPATIENT)
Dept: URGENT CARE | Facility: PHYSICIAN GROUP | Age: 49
End: 2024-08-06
Payer: COMMERCIAL

## 2024-08-06 VITALS
OXYGEN SATURATION: 95 % | TEMPERATURE: 98.7 F | SYSTOLIC BLOOD PRESSURE: 140 MMHG | WEIGHT: 239 LBS | DIASTOLIC BLOOD PRESSURE: 78 MMHG | RESPIRATION RATE: 16 BRPM | HEART RATE: 84 BPM | HEIGHT: 68 IN | BODY MASS INDEX: 36.22 KG/M2

## 2024-08-06 DIAGNOSIS — J06.9 VIRAL URI WITH COUGH: Primary | ICD-10-CM

## 2024-08-06 LAB
FLUAV RNA SPEC QL NAA+PROBE: NEGATIVE
FLUBV RNA SPEC QL NAA+PROBE: NEGATIVE
RSV RNA SPEC QL NAA+PROBE: NEGATIVE
SARS-COV-2 RNA RESP QL NAA+PROBE: NEGATIVE

## 2024-08-06 PROCEDURE — 3078F DIAST BP <80 MM HG: CPT | Performed by: PHYSICIAN ASSISTANT

## 2024-08-06 PROCEDURE — 0241U POCT CEPHEID COV-2, FLU A/B, RSV - PCR: CPT | Performed by: PHYSICIAN ASSISTANT

## 2024-08-06 PROCEDURE — 99213 OFFICE O/P EST LOW 20 MIN: CPT | Performed by: PHYSICIAN ASSISTANT

## 2024-08-06 PROCEDURE — 3077F SYST BP >= 140 MM HG: CPT | Performed by: PHYSICIAN ASSISTANT

## 2024-08-06 RX ORDER — DEXTROMETHORPHAN HYDROBROMIDE AND PROMETHAZINE HYDROCHLORIDE 15; 6.25 MG/5ML; MG/5ML
5 SYRUP ORAL NIGHTLY PRN
Qty: 118 ML | Refills: 0 | Status: SHIPPED | OUTPATIENT
Start: 2024-08-06

## 2024-08-06 RX ORDER — BENZONATATE 100 MG/1
100 CAPSULE ORAL 3 TIMES DAILY PRN
Qty: 21 CAPSULE | Refills: 0 | Status: SHIPPED | OUTPATIENT
Start: 2024-08-06

## 2024-08-06 NOTE — PROGRESS NOTES
"Subjective:   Guilherme Williamson is a 49 y.o. male who presents for URI (X 5 days with cough, sore throat, fever, runny nose and mucus. )      HPI  The patient presents to the Urgent Care with complaints of flulike symptoms onset 4 days ago.  Symptoms started with a sore throat followed by cough.  Cough is productive now.  He had diarrhea the first day which resolved.  Hot and cold flashes.  Fever yesterday 100 F.  Bilateral ear congestion and ear fullness right worse than left.  Associated nasal congestion.  Had episodes of posttussive vomiting.  Reports body aches. Denies any chest pain, SOB. Tolerating fluids. History of COVID without complication.       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         Objective:     BP (!) 140/78 (BP Location: Right arm, Patient Position: Sitting, BP Cuff Size: Adult long)   Pulse 84   Temp 37.1 °C (98.7 °F) (Temporal)   Resp 16   Ht 1.727 m (5' 8\")   Wt 108 kg (239 lb)   SpO2 95%   BMI 36.34 kg/m²     Physical Exam  Vitals reviewed.   Constitutional:       General: He is not in acute distress.     Appearance: Normal appearance. He is not ill-appearing or toxic-appearing.   HENT:      Right Ear: Tympanic membrane, ear canal and external ear normal.      Left Ear: Tympanic membrane, ear canal and external ear normal.      Nose: Congestion present.      Mouth/Throat:      Mouth: Mucous membranes are moist.      Pharynx: Uvula midline. Posterior oropharyngeal erythema present. No pharyngeal swelling, oropharyngeal exudate or uvula swelling.      Tonsils: No tonsillar exudate or tonsillar abscesses.   Eyes:      Conjunctiva/sclera: Conjunctivae normal.   Cardiovascular:      " Rate and Rhythm: Normal rate and regular rhythm.      Heart sounds: Normal heart sounds.   Pulmonary:      Effort: Pulmonary effort is normal. No respiratory distress.      Breath sounds: Normal breath sounds. No wheezing, rhonchi or rales.   Musculoskeletal:      Cervical back: Neck supple. No rigidity.   Lymphadenopathy:      Cervical: No cervical adenopathy.   Skin:     General: Skin is warm and dry.   Neurological:      General: No focal deficit present.      Mental Status: He is alert and oriented to person, place, and time.   Psychiatric:         Mood and Affect: Mood normal.         Behavior: Behavior normal.       Results for orders placed or performed in visit on 08/06/24   POCT CEPHEID COV-2, FLU A/B, RSV - PCR   Result Value Ref Range    SARS-CoV-2 by PCR Negative Negative, Invalid    Influenza virus A RNA Negative Negative, Invalid    Influenza virus B, PCR Negative Negative, Invalid    RSV, PCR Negative Negative, Invalid       Diagnosis and associated orders:     1. Viral URI with cough  - benzonatate (TESSALON) 100 MG Cap; Take 1 Capsule by mouth 3 times a day as needed for Cough.  Dispense: 21 Capsule; Refill: 0  - promethazine-dextromethorphan (PROMETHAZINE-DM) 6.25-15 MG/5ML syrup; Take 5 mL by mouth at bedtime as needed for Cough.  Dispense: 118 mL; Refill: 0  - POCT CEPHEID COV-2, FLU A/B, RSV - PCR       Comments/MDM:     The patient's presenting symptoms and exam findings are consistent with a upper respiratory infection most likely viral etiology. They have a normal pulse oximetry on room air, afebrile, and a normal pulmonary exam. Overall, the patient is very well appearing. I do not feel that this patient would benefit from antibiotics at this time.   Recommended symptomatic and supportive care at this time that includes plenty of fluids, rest, Tylenol/Ibuprofen for pain/fever, warm salt water gargles for sore throat, OTC cough and decongestant medication, Flonase, nasal saline washes. If no  improvement in 5-7 days or any worsening symptoms, recommend returning to the urgent care for re-evaluation.        I personally reviewed prior external notes and test results pertinent to today's visit. Pathogenesis of diagnosis discussed including typical length and natural progression. Supportive care, natural history, differential diagnoses, and indications for immediate follow-up discussed. Patient expresses understanding and agrees to plan. Patient denies any other questions or concerns.     Follow-up with the primary care physician for recheck, reevaluation, and consideration of further management.    Please note that this dictation was created using voice recognition software. I have made a reasonable attempt to correct obvious errors, but I expect that there are errors of grammar and possibly content that I did not discover before finalizing the note.    This note was electronically signed by Billy Mejia PA-C

## 2024-08-06 NOTE — LETTER
August 6, 2024         Patient: Guilherme Williamson   YOB: 1975   Date of Visit: 8/6/2024           To Whom it May Concern:    Guilherme Williamson was seen in my clinic on 8/6/2024. Please excuse from work through 8/7/24.     If you have any questions or concerns, please don't hesitate to call.        Sincerely,           Billy Mejia P.A.-C.  Electronically Signed

## 2024-08-26 DIAGNOSIS — E11.9 TYPE 2 DIABETES MELLITUS WITHOUT COMPLICATION, WITHOUT LONG-TERM CURRENT USE OF INSULIN (HCC): ICD-10-CM

## 2024-08-29 ENCOUNTER — TELEPHONE (OUTPATIENT)
Dept: HEALTH INFORMATION MANAGEMENT | Facility: OTHER | Age: 49
End: 2024-08-29
Payer: COMMERCIAL

## 2024-09-24 DIAGNOSIS — I10 HYPERTENSION, UNCONTROLLED: ICD-10-CM

## 2024-09-24 RX ORDER — AMLODIPINE BESYLATE 10 MG/1
10 TABLET ORAL DAILY
Qty: 90 TABLET | Refills: 0 | Status: SHIPPED | OUTPATIENT
Start: 2024-09-24

## 2024-09-24 NOTE — TELEPHONE ENCOUNTER
Received request via: Pharmacy    Was the patient seen in the last year in this department? Yes  LOV : 7/10/2024   Does the patient have an active prescription (recently filled or refills available) for medication(s) requested? No    Pharmacy Name: [facility]    Does the patient have snf Plus and need 100-day supply? (This applies to ALL medications) Patient does not have SCP

## 2024-10-08 ENCOUNTER — OFFICE VISIT (OUTPATIENT)
Dept: MEDICAL GROUP | Facility: PHYSICIAN GROUP | Age: 49
End: 2024-10-08
Payer: COMMERCIAL

## 2024-10-08 DIAGNOSIS — E11.9 TYPE 2 DIABETES MELLITUS WITHOUT COMPLICATION, WITHOUT LONG-TERM CURRENT USE OF INSULIN (HCC): ICD-10-CM

## 2024-10-08 LAB
HBA1C MFR BLD: 9.7 % (ref ?–5.8)
POCT INT CON NEG: NEGATIVE
POCT INT CON POS: POSITIVE

## 2024-10-08 PROCEDURE — 99403 PREV MED CNSL INDIV APPRX 45: CPT | Performed by: NURSE PRACTITIONER

## 2024-10-08 PROCEDURE — 83036 HEMOGLOBIN GLYCOSYLATED A1C: CPT | Performed by: NURSE PRACTITIONER

## 2024-10-11 DIAGNOSIS — J30.89 NON-SEASONAL ALLERGIC RHINITIS DUE TO OTHER ALLERGIC TRIGGER: ICD-10-CM

## 2024-10-11 DIAGNOSIS — H92.02 OTALGIA, LEFT: ICD-10-CM

## 2024-10-11 DIAGNOSIS — J01.40 ACUTE NON-RECURRENT PANSINUSITIS: ICD-10-CM

## 2024-10-14 RX ORDER — AZELASTINE HYDROCHLORIDE 137 UG/1
1 SPRAY, METERED NASAL 2 TIMES DAILY
Qty: 30 ML | Refills: 1 | Status: SHIPPED | OUTPATIENT
Start: 2024-10-14

## 2024-11-19 ENCOUNTER — OFFICE VISIT (OUTPATIENT)
Dept: URGENT CARE | Facility: PHYSICIAN GROUP | Age: 49
End: 2024-11-19
Payer: COMMERCIAL

## 2024-11-19 VITALS
RESPIRATION RATE: 16 BRPM | BODY MASS INDEX: 37.28 KG/M2 | HEART RATE: 77 BPM | WEIGHT: 246 LBS | TEMPERATURE: 98 F | DIASTOLIC BLOOD PRESSURE: 110 MMHG | SYSTOLIC BLOOD PRESSURE: 170 MMHG | HEIGHT: 68 IN | OXYGEN SATURATION: 98 %

## 2024-11-19 DIAGNOSIS — J01.40 ACUTE NON-RECURRENT PANSINUSITIS: ICD-10-CM

## 2024-11-19 DIAGNOSIS — J01.90 ACUTE BACTERIAL SINUSITIS: ICD-10-CM

## 2024-11-19 DIAGNOSIS — J30.89 NON-SEASONAL ALLERGIC RHINITIS DUE TO OTHER ALLERGIC TRIGGER: ICD-10-CM

## 2024-11-19 DIAGNOSIS — B96.89 ACUTE BACTERIAL SINUSITIS: ICD-10-CM

## 2024-11-19 DIAGNOSIS — H92.02 OTALGIA, LEFT: ICD-10-CM

## 2024-11-19 PROCEDURE — 3077F SYST BP >= 140 MM HG: CPT | Performed by: PHYSICIAN ASSISTANT

## 2024-11-19 PROCEDURE — 99213 OFFICE O/P EST LOW 20 MIN: CPT | Performed by: PHYSICIAN ASSISTANT

## 2024-11-19 PROCEDURE — 3080F DIAST BP >= 90 MM HG: CPT | Performed by: PHYSICIAN ASSISTANT

## 2024-11-19 ASSESSMENT — ENCOUNTER SYMPTOMS
ABDOMINAL PAIN: 0
FEVER: 0
MYALGIAS: 0
SHORTNESS OF BREATH: 0
SPUTUM PRODUCTION: 0
PALPITATIONS: 0
WHEEZING: 0
NAUSEA: 0
VOMITING: 0
COUGH: 0
SORE THROAT: 0
DIAPHORESIS: 0
DIZZINESS: 0
HEADACHES: 0
CHILLS: 0
SINUS PAIN: 1

## 2024-11-19 NOTE — LETTER
November 19, 2024    To Whom It May Concern:         This is confirmation that Guilherme Williamson attended his scheduled appointment with Carroll Mejia P.A.-C. on 11/19/24.  Please excuse the patient's absence from work on 11/20/2024.         If you have any questions please do not hesitate to call me at the phone number listed below.    Sincerely,          Carroll Mejia P.A.-C.  [phone]

## 2024-11-20 RX ORDER — AZELASTINE HYDROCHLORIDE 137 UG/1
1 SPRAY, METERED NASAL 2 TIMES DAILY
Qty: 90 ML | Refills: 1 | Status: SHIPPED | OUTPATIENT
Start: 2024-11-20

## 2024-11-20 NOTE — PROGRESS NOTES
Subjective:     CHIEF COMPLAINT  Chief Complaint   Patient presents with    Sinus Problem     Almost 2 wk sinu pressure, ear pressure, chills       HPI  Guilherme Williamson is a very pleasant 49 y.o. male who presents to the clinic with sinus pain and pressure x 2 weeks.  Patient has a long history of recurrent sinusitis.  He is scheduled to follow-up with ENT in the next 3 weeks.  Currently experiencing pressure over both frontal and maxillary sinuses.  Ears are congested.  Has not been running a fever.  No cough.  Occasionally develops a mild sore throat in the morning that resolves.  States earlier in the course of illness he was experiencing some chills that have improved.  Has been taking Tylenol Cold and sinus for symptoms.    REVIEW OF SYSTEMS  Review of Systems   Constitutional:  Negative for chills, diaphoresis, fever and malaise/fatigue.   HENT:  Positive for congestion and sinus pain. Negative for ear pain and sore throat.    Respiratory:  Negative for cough, sputum production, shortness of breath and wheezing.    Cardiovascular:  Negative for chest pain and palpitations.   Gastrointestinal:  Negative for abdominal pain, nausea and vomiting.   Musculoskeletal:  Negative for myalgias.   Neurological:  Negative for dizziness and headaches.   Endo/Heme/Allergies:  Negative for environmental allergies.       PAST MEDICAL HISTORY  Patient Active Problem List    Diagnosis Date Noted    Type 2 diabetes mellitus without complication, without long-term current use of insulin ([facility]) 07/10/2024    Stress reaction 10/23/2023    Recurrent sinusitis 08/02/2023    Chronic pansinusitis 02/22/2022    Hypertension 08/04/2014    Hyperglycemia 08/04/2014    Dyslipidemia 08/04/2014    Obesity (BMI 30-39.9) 08/04/2014       SURGICAL HISTORY  patient denies any surgical history    ALLERGIES  Allergies   Allergen Reactions    Flonase [Fluticasone]      Eye    Other Drug      Can't take anything with steroids, caused blindness  "in eye.        CURRENT MEDICATIONS  Home Medications       Reviewed by Carroll Mejia P.A.-C. (Physician Assistant) on 24 at 1828  Med List Status: <None>     Medication Last Dose Status   amLODIPine (NORVASC) 10 MG Tab Taking Active   Azelastine (ASTELIN) 137 MCG/SPRAY Solution Taking Active   benzonatate (TESSALON) 100 MG Cap Taking Active   losartan (COZAAR) 100 MG Tab Taking Active   promethazine-dextromethorphan (PROMETHAZINE-DM) 6.25-15 MG/5ML syrup Taking Active                    SOCIAL HISTORY  Social History     Tobacco Use    Smoking status: Former     Current packs/day: 0.00     Average packs/day: 0.3 packs/day for 4.0 years (1.0 ttl pk-yrs)     Types: Cigarettes     Start date: 1996     Quit date: 2000     Years since quittin.3    Smokeless tobacco: Never    Tobacco comments:     3-5 years ago   Vaping Use    Vaping status: Never Used   Substance and Sexual Activity    Alcohol use: Yes     Alcohol/week: 0.0 oz     Comment: a couple beers a month    Drug use: No    Sexual activity: Yes     Partners: Female       FAMILY HISTORY  Family History   Problem Relation Age of Onset    Stroke Father     Hypertension Father     Diabetes Paternal Grandmother     Stroke Paternal Grandfather     Cancer Neg Hx     Heart Disease Neg Hx           Objective:     VITAL SIGNS: BP (!) 170/110   Pulse 77   Temp 36.7 °C (98 °F)   Resp 16   Ht 1.727 m (5' 8\")   Wt 112 kg (246 lb)   SpO2 98%   BMI 37.40 kg/m²     PHYSICAL EXAM  Physical Exam  Constitutional:       General: He is not in acute distress.     Appearance: Normal appearance. He is not ill-appearing, toxic-appearing or diaphoretic.   HENT:      Head: Normocephalic and atraumatic.      Comments: Tenderness over the bilateral frontal and maxillary sinuses to light palpation.     Right Ear: Tympanic membrane, ear canal and external ear normal.      Left Ear: Tympanic membrane, ear canal and external ear normal.      Nose: Congestion and " rhinorrhea present.      Mouth/Throat:      Mouth: Mucous membranes are moist.      Pharynx: No oropharyngeal exudate or posterior oropharyngeal erythema.   Eyes:      Conjunctiva/sclera: Conjunctivae normal.   Cardiovascular:      Rate and Rhythm: Normal rate and regular rhythm.      Pulses: Normal pulses.      Heart sounds: Normal heart sounds.   Pulmonary:      Effort: Pulmonary effort is normal.      Breath sounds: Normal breath sounds. No wheezing, rhonchi or rales.   Musculoskeletal:      Cervical back: Normal range of motion. No muscular tenderness.   Lymphadenopathy:      Cervical: No cervical adenopathy.   Skin:     General: Skin is warm and dry.      Capillary Refill: Capillary refill takes less than 2 seconds.   Neurological:      Mental Status: He is alert.   Psychiatric:         Mood and Affect: Mood normal.         Thought Content: Thought content normal.         Assessment/Plan:     1. Acute bacterial sinusitis  - amoxicillin-clavulanate (AUGMENTIN) 875-125 MG Tab; Take 1 Tablet by mouth 2 times a day for 7 days.  Dispense: 14 Tablet; Refill: 0      MDM/Comments:    -Nasal spray and allergy medications as directed (Zyrtec or Loratadine).  -You may try saline irrigation or neti pot.   -Drink plenty of fluids.  -Ibuprofen or Tylenol as directed for pain.   -Warm compress to sinuses.      Follow up with primary care provider. Urgently for worsening symptoms, persistent fevers, facial swelling, visual changes, weakness, elevated heart rate, stiff neck, symptoms last longer than 10 days, or any other concerns.    Differential diagnosis, natural history, supportive care, and indications for immediate follow-up discussed. All questions answered. Patient agrees with the plan of care.    Follow-up as needed if symptoms worsen or fail to improve to PCP, Urgent care or Emergency Room.    I have personally reviewed prior external notes and test results pertinent to today's visit.  I have independently reviewed  and interpreted all diagnostics ordered during this urgent care acute visit.   Discussed management options (risks,benefits, and alternatives to treatment). Pt expresses understanding and the treatment plan was agreed upon. Questions were encouraged and answered to pt's satisfaction.    Please note that this dictation was created using voice recognition software. I have made a reasonable attempt to correct obvious errors, but I expect that there are errors of grammar and possibly content that I did not discover before finalizing the note.

## 2024-11-21 ENCOUNTER — PATIENT MESSAGE (OUTPATIENT)
Dept: MEDICAL GROUP | Facility: LAB | Age: 49
End: 2024-11-21
Payer: COMMERCIAL

## 2024-11-21 DIAGNOSIS — J01.90 SUBACUTE SINUSITIS, UNSPECIFIED LOCATION: ICD-10-CM

## 2024-12-19 DIAGNOSIS — I10 HYPERTENSION, UNCONTROLLED: ICD-10-CM

## 2024-12-19 RX ORDER — AMLODIPINE BESYLATE 10 MG/1
10 TABLET ORAL DAILY
Qty: 90 TABLET | Refills: 0 | Status: SHIPPED | OUTPATIENT
Start: 2024-12-19

## 2024-12-19 NOTE — TELEPHONE ENCOUNTER
Received request via: Pharmacy    Was the patient seen in the last year in this department? Yes  LOV : 7/10/2024  Does the patient have an active prescription (recently filled or refills available) for medication(s) requested? No    Pharmacy Name: [facility]    Does the patient have intermediate Plus and need 100-day supply? (This applies to ALL medications) Patient does not have SCP

## 2025-01-06 RX ORDER — NAPROXEN 500 MG/1
TABLET ORAL
Qty: 60 TABLET | Refills: 0 | Status: SHIPPED | OUTPATIENT
Start: 2025-01-06

## 2025-01-14 ENCOUNTER — OFFICE VISIT (OUTPATIENT)
Dept: URGENT CARE | Facility: PHYSICIAN GROUP | Age: 50
End: 2025-01-14
Payer: COMMERCIAL

## 2025-01-14 VITALS
SYSTOLIC BLOOD PRESSURE: 152 MMHG | WEIGHT: 249.12 LBS | DIASTOLIC BLOOD PRESSURE: 88 MMHG | HEIGHT: 68 IN | RESPIRATION RATE: 18 BRPM | HEART RATE: 92 BPM | BODY MASS INDEX: 37.76 KG/M2 | OXYGEN SATURATION: 99 % | TEMPERATURE: 98.4 F

## 2025-01-14 DIAGNOSIS — B96.89 ACUTE BACTERIAL SINUSITIS: ICD-10-CM

## 2025-01-14 DIAGNOSIS — J01.90 ACUTE BACTERIAL SINUSITIS: ICD-10-CM

## 2025-01-14 DIAGNOSIS — H65.02 NON-RECURRENT ACUTE SEROUS OTITIS MEDIA OF LEFT EAR: ICD-10-CM

## 2025-01-14 PROCEDURE — 99213 OFFICE O/P EST LOW 20 MIN: CPT | Performed by: PHYSICIAN ASSISTANT

## 2025-01-14 PROCEDURE — 3079F DIAST BP 80-89 MM HG: CPT | Performed by: PHYSICIAN ASSISTANT

## 2025-01-14 PROCEDURE — 3077F SYST BP >= 140 MM HG: CPT | Performed by: PHYSICIAN ASSISTANT

## 2025-01-14 ASSESSMENT — ENCOUNTER SYMPTOMS
SHORTNESS OF BREATH: 0
NAUSEA: 1
DIARRHEA: 0
FEVER: 0
HEADACHES: 1
COUGH: 0
EYE REDNESS: 0
SORE THROAT: 0
VOMITING: 0
EYE DISCHARGE: 0
SINUS PAIN: 1

## 2025-01-15 NOTE — PROGRESS NOTES
Subjective     Guilherme Williamson is a 49 y.o. male who presents with Otalgia (Left ear pain ) and Sinus Problem            Sinus Problem  This is a new problem. Episode onset: x 2 weeks ago. The problem has been gradually worsening since onset. There has been no fever. Associated symptoms include congestion, ear pain and headaches. Pertinent negatives include no coughing, shortness of breath or sore throat. Treatments tried: OTC Tyenol Cold and Sinus.     The patient reports a history of ongoing sinus infections and ear problems.  The patient states he previously saw ENT, but unfortunately due to changes in insurance he needs to reestablish with an ENT.  The patient is awaiting a referral to a new ENT from his primary care provider.    PMH:  has a past medical history of Contusion of rib, left, subsequent encounter (1/11/2022), Hypertension, Tinea cruris (8/4/2014), and Viral upper respiratory tract infection (6/15/2022).  MEDS:   Current Outpatient Medications:     naproxen (NAPROSYN) 500 MG Tab, TAKE ONE TABLET BY MOUTH TWO TIMES A DAY. WITH FOOD, Disp: 60 Tablet, Rfl: 0    amLODIPine (NORVASC) 10 MG Tab, TAKE ONE TABLET BY MOUTH ONCE DAILY, Disp: 90 Tablet, Rfl: 0    Azelastine (ASTELIN) 137 MCG/SPRAY Solution, ADMINISTER 1 SPRAY INTO AFFECTED NOSTRIL(S) 2 TIMES A DAY., Disp: 90 mL, Rfl: 1    losartan (COZAAR) 100 MG Tab, Take 1 Tablet by mouth every day., Disp: 90 Tablet, Rfl: 3    benzonatate (TESSALON) 100 MG Cap, Take 1 Capsule by mouth 3 times a day as needed for Cough. (Patient not taking: Reported on 1/14/2025), Disp: 21 Capsule, Rfl: 0    promethazine-dextromethorphan (PROMETHAZINE-DM) 6.25-15 MG/5ML syrup, Take 5 mL by mouth at bedtime as needed for Cough. 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      SURGHX: No past  "surgical history on file.  SOCHX:  reports that he quit smoking about 24 years ago. His smoking use included cigarettes. He started smoking about 28 years ago. He has a 1 pack-year smoking history. He has never used smokeless tobacco. He reports current alcohol use. He reports that he does not use drugs.  FH: Family history was reviewed, no pertinent findings to report      Review of Systems   Constitutional:  Negative for fever.   HENT:  Positive for congestion, ear pain and sinus pain. Negative for sore throat.    Eyes:  Negative for discharge and redness.   Respiratory:  Negative for cough and shortness of breath.    Cardiovascular:  Negative for chest pain.   Gastrointestinal:  Positive for nausea. Negative for diarrhea and vomiting.   Neurological:  Positive for headaches.              Objective     BP (!) 152/88   Pulse 92   Temp 36.9 °C (98.4 °F)   Resp 18   Ht 1.727 m (5' 8\")   Wt 113 kg (249 lb 1.9 oz)   SpO2 99%   BMI 37.88 kg/m²      Physical Exam  Constitutional:       General: He is not in acute distress.     Appearance: Normal appearance. He is not ill-appearing.   HENT:      Head: Normocephalic and atraumatic.      Right Ear: Tympanic membrane, ear canal and external ear normal.      Left Ear: Ear canal and external ear normal. Tympanic membrane is injected and erythematous.      Nose: Nose normal.      Mouth/Throat:      Mouth: Mucous membranes are moist.      Pharynx: Oropharynx is clear. No posterior oropharyngeal erythema.   Eyes:      Extraocular Movements: Extraocular movements intact.      Conjunctiva/sclera: Conjunctivae normal.   Cardiovascular:      Rate and Rhythm: Normal rate and regular rhythm.      Heart sounds: Normal heart sounds.   Pulmonary:      Effort: Pulmonary effort is normal. No respiratory distress.      Breath sounds: Normal breath sounds. No wheezing.   Musculoskeletal:         General: Normal range of motion.      Cervical back: Normal range of motion and neck " supple.   Skin:     General: Skin is warm and dry.   Neurological:      Mental Status: He is alert and oriented to person, place, and time.                             Assessment & Plan        Assessment & Plan  Acute bacterial sinusitis    Orders:    amoxicillin-clavulanate (AUGMENTIN) 875-125 MG Tab; Take 1 Tablet by mouth 2 times a day for 7 days.    Non-recurrent acute serous otitis media of left ear    Orders:    amoxicillin-clavulanate (AUGMENTIN) 875-125 MG Tab; Take 1 Tablet by mouth 2 times a day for 7 days.            Differential diagnoses, supportive care, and indications for immediate follow-up discussed with patient.   Instructed to return to clinic or nearest emergency department for any change in condition, further concerns, or worsening of symptoms.    OTC Tylenol or Motrin for fever/discomfort.  OTC cough/cold medication for symptomatic relief  OTC antihistamines for symptomatic relief.  OTC Flonase for symptomatic relief  OTC Supportive Care for Congestion - saline nasal spray or neti pot  Drink plenty of fluids  Follow-up with PCP  Return to clinic or go to the ED if symptoms worsen or fail to improve, or if the patient should develop worsening/increasing sinus pain/pressure, congestion, ear pain, sore throat, headache, cough, shortness of breath, wheezing, chest pain, fever/chills, and/or any concerning symptoms.    Discussed plan with the patient, and he agrees to the above.    I personally reviewed prior external notes and test results pertinent to today's visit.  I have independently reviewed and interpreted all diagnostics ordered during this urgent care visit.     Please note that this dictation was created using voice recognition software. I have made every reasonable attempt to correct obvious errors, but I expect that there may be errors of grammar and possibly content that I did not discover before finalizing the note.     This note was electronically signed by Fany Min  THOMAS

## 2025-02-03 RX ORDER — NAPROXEN 500 MG/1
TABLET ORAL
Qty: 60 TABLET | Refills: 0 | Status: SHIPPED | OUTPATIENT
Start: 2025-02-03

## 2025-03-15 DIAGNOSIS — I10 HYPERTENSION, UNCONTROLLED: ICD-10-CM

## 2025-03-17 RX ORDER — AMLODIPINE BESYLATE 10 MG/1
10 TABLET ORAL DAILY
Qty: 90 TABLET | Refills: 3 | Status: SHIPPED | OUTPATIENT
Start: 2025-03-17

## 2025-03-21 ENCOUNTER — OFFICE VISIT (OUTPATIENT)
Dept: URGENT CARE | Facility: CLINIC | Age: 50
End: 2025-03-21
Payer: COMMERCIAL

## 2025-03-21 VITALS
TEMPERATURE: 96.6 F | SYSTOLIC BLOOD PRESSURE: 152 MMHG | BODY MASS INDEX: 38.77 KG/M2 | OXYGEN SATURATION: 94 % | WEIGHT: 247 LBS | DIASTOLIC BLOOD PRESSURE: 108 MMHG | HEART RATE: 93 BPM | RESPIRATION RATE: 16 BRPM | HEIGHT: 67 IN

## 2025-03-21 DIAGNOSIS — E11.9 TYPE 2 DIABETES MELLITUS WITHOUT COMPLICATION, WITHOUT LONG-TERM CURRENT USE OF INSULIN (HCC): ICD-10-CM

## 2025-03-21 DIAGNOSIS — I10 ELEVATED BLOOD PRESSURE READING IN OFFICE WITH DIAGNOSIS OF HYPERTENSION: ICD-10-CM

## 2025-03-21 DIAGNOSIS — J32.9 RECURRENT SINUSITIS: ICD-10-CM

## 2025-03-21 PROCEDURE — 3077F SYST BP >= 140 MM HG: CPT | Performed by: FAMILY MEDICINE

## 2025-03-21 PROCEDURE — 99213 OFFICE O/P EST LOW 20 MIN: CPT | Performed by: FAMILY MEDICINE

## 2025-03-21 PROCEDURE — 3080F DIAST BP >= 90 MM HG: CPT | Performed by: FAMILY MEDICINE

## 2025-03-21 ASSESSMENT — ENCOUNTER SYMPTOMS
NAUSEA: 0
WEIGHT LOSS: 0
VOMITING: 0
MYALGIAS: 0

## 2025-03-22 NOTE — ASSESSMENT & PLAN NOTE
Orders:    amoxicillin-clavulanate (AUGMENTIN) 875-125 MG Tab; Take 1 Tablet by mouth 2 times a day for 10 days.

## 2025-03-22 NOTE — PROGRESS NOTES
"Subjective     Guilherme Williamson is a 50 y.o. male who presents with Ear Pain (Left ear pain, sinus pressure x 1 week/Bump right below left ear x 1 day)            1 week sinus pressure and drainage.  No fever.  PMH recurrent sinusitis and is followed by ENT.  No relief with nasal saline.  No cough.    PMH hypertension with elevated blood pressure reading in office.  He has not taken his evening medications.  No headache.  No acute vision change.  No unilateral weakness.  No chest pain.    No other aggravating or alleviating factors.        Review of Systems   Constitutional:  Negative for malaise/fatigue and weight loss.   Gastrointestinal:  Negative for nausea and vomiting.   Musculoskeletal:  Negative for joint pain and myalgias.   Skin:  Negative for itching and rash.              Objective     BP (!) 152/108 (BP Location: Left arm, Patient Position: Sitting, BP Cuff Size: Adult)   Pulse 93   Temp 35.9 °C (96.6 °F) (Temporal)   Resp 16   Ht 1.702 m (5' 7\")   Wt 112 kg (247 lb)   SpO2 94%   BMI 38.69 kg/m²      Physical Exam  Constitutional:       General: He is not in acute distress.     Appearance: He is well-developed.   HENT:      Head: Normocephalic and atraumatic.      Right Ear: Tympanic membrane normal.      Left Ear: Tympanic membrane normal.      Nose: Congestion present.      Mouth/Throat:      Comments: Purulent PND  Eyes:      Conjunctiva/sclera: Conjunctivae normal.   Cardiovascular:      Rate and Rhythm: Normal rate and regular rhythm.      Heart sounds: Normal heart sounds. No murmur heard.  Pulmonary:      Effort: Pulmonary effort is normal.      Breath sounds: Normal breath sounds. No wheezing.   Skin:     General: Skin is warm and dry.      Findings: No rash.   Neurological:      Mental Status: He is alert.                                  Assessment & Plan  Recurrent sinusitis    Orders:    amoxicillin-clavulanate (AUGMENTIN) 875-125 MG Tab; Take 1 Tablet by mouth 2 times a day " for 10 days.    Elevated blood pressure reading in office with diagnosis of hypertension       Continue nasal saline.     Differential diagnosis, natural history, supportive care, and indications for immediate follow-up were discussed.     He will take his evening blood pressure medications and monitor his blood pressure to follow-up with primary care.

## 2025-03-26 ENCOUNTER — OFFICE VISIT (OUTPATIENT)
Dept: URGENT CARE | Facility: PHYSICIAN GROUP | Age: 50
End: 2025-03-26
Payer: COMMERCIAL

## 2025-03-26 VITALS
HEIGHT: 68 IN | OXYGEN SATURATION: 94 % | WEIGHT: 244.8 LBS | SYSTOLIC BLOOD PRESSURE: 152 MMHG | RESPIRATION RATE: 19 BRPM | DIASTOLIC BLOOD PRESSURE: 84 MMHG | TEMPERATURE: 97.5 F | BODY MASS INDEX: 37.1 KG/M2 | HEART RATE: 87 BPM

## 2025-03-26 DIAGNOSIS — I10 PRIMARY HYPERTENSION: ICD-10-CM

## 2025-03-26 DIAGNOSIS — H65.93 FLUID LEVEL BEHIND TYMPANIC MEMBRANE OF BOTH EARS: ICD-10-CM

## 2025-03-26 DIAGNOSIS — J34.89 SINUS PRESSURE: ICD-10-CM

## 2025-03-26 PROCEDURE — 99214 OFFICE O/P EST MOD 30 MIN: CPT | Performed by: PHYSICIAN ASSISTANT

## 2025-03-26 PROCEDURE — 3079F DIAST BP 80-89 MM HG: CPT | Performed by: PHYSICIAN ASSISTANT

## 2025-03-26 PROCEDURE — 0241U POCT CEPHEID COV-2, FLU A/B, RSV - PCR: CPT | Performed by: PHYSICIAN ASSISTANT

## 2025-03-26 PROCEDURE — 3077F SYST BP >= 140 MM HG: CPT | Performed by: PHYSICIAN ASSISTANT

## 2025-03-26 RX ORDER — CETIRIZINE HYDROCHLORIDE 10 MG/1
10 TABLET ORAL DAILY
Qty: 14 TABLET | Refills: 0 | Status: SHIPPED | OUTPATIENT
Start: 2025-03-26 | End: 2025-04-09

## 2025-03-26 ASSESSMENT — ENCOUNTER SYMPTOMS
HEADACHES: 1
SORE THROAT: 1
EYE DISCHARGE: 0
SINUS PAIN: 1
CHILLS: 1
SPUTUM PRODUCTION: 0
FEVER: 0
WHEEZING: 0
DIARRHEA: 0
DIZZINESS: 0
EYE REDNESS: 0
VOMITING: 0
COUGH: 0
MYALGIAS: 0
NECK PAIN: 0

## 2025-03-26 NOTE — LETTER
March 26, 2025         Patient: Guilherme Williamson   YOB: 1975   Date of Visit: 3/26/2025           To Whom it May Concern:    Guliherme Williamson was seen in my clinic on 3/26/2025. Please excuse this patient from work due to recent ailment.     If you have any questions or concerns, please don't hesitate to call.        Sincerely,           Eduardo Ko P.A.-C.  Electronically Signed

## 2025-03-26 NOTE — PROGRESS NOTES
"Kenzie Williamson is a 50 y.o. male who presents with Ear Pain (And sinus pressure x 6 days ( previous medication is not working )/)            Patient is a 50-year-old male presents to urgent care with continued ear discomfort and fullness, sinus pain and pressure, postnasal drainage with associated fatigue.  Patient was originally evaluated on 3- and was started on Augmentin for suspected sinusitis.  Patient admits at that time he was 1 day into symptoms with the above however progressively has worsened despite being on Augmentin.  Patient does report he has been utilizing Tylenol sinus of which he took prior to arrival.  Patient also takes losartan along with amlodipine for blood pressure.  Denies specific cough as he feels that this is mainly because of drainage.  He did feel as though he had a fever earlier however admits temperature was 99.        Review of Systems   Constitutional:  Positive for chills and malaise/fatigue. Negative for fever.   HENT:  Positive for congestion, ear pain, sinus pain and sore throat. Negative for ear discharge.    Eyes:  Negative for discharge and redness.   Respiratory:  Negative for cough, sputum production and wheezing.    Cardiovascular:  Negative for chest pain.   Gastrointestinal:  Negative for diarrhea and vomiting.   Genitourinary:  Negative for dysuria and urgency.   Musculoskeletal:  Negative for myalgias and neck pain.   Skin:  Negative for itching and rash.   Neurological:  Positive for headaches. Negative for dizziness.              Objective     BP (!) 152/84   Pulse 87   Temp 36.4 °C (97.5 °F)   Resp 19   Ht 1.727 m (5' 8\")   Wt 111 kg (244 lb 12.8 oz)   SpO2 94%   BMI 37.22 kg/m²    PMH:  has a past medical history of Contusion of rib, left, subsequent encounter (1/11/2022), Hypertension, Tinea cruris (8/4/2014), and Viral upper respiratory tract infection (6/15/2022).  MEDS: Reviewed .   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      SURGHX: No past surgical history on file.  SOCHX:  reports that he quit smoking about 24 years ago. His smoking use included cigarettes. He started smoking about 28 years ago. He has a 1 pack-year smoking history. He has never used smokeless tobacco. He reports that he does not currently use alcohol. He reports that he does not use drugs.  FH: Family history was reviewed, no pertinent findings to report    Physical Exam  Vitals reviewed.   Constitutional:       Appearance: He is well-developed.   HENT:      Head: Normocephalic and atraumatic.      Comments: Bilateral maxillary sinus tenderness with percussion-mild rhinorrhea noted.  Bilateral TMs with clear middle ear effusions without evidence of erythema.  Posterior oropharynx is with erythema, positive postnasal drainage.     Mouth/Throat:      Pharynx: No oropharyngeal exudate.   Eyes:      Pupils: Pupils are equal, round, and reactive to light.   Cardiovascular:      Rate and Rhythm: Normal rate and regular rhythm.   Pulmonary:      Effort: Pulmonary effort is normal. No respiratory distress.      Breath sounds: Normal breath sounds. No wheezing.   Musculoskeletal:         General: Normal range of motion.      Cervical back: Normal range of motion and neck supple.   Lymphadenopathy:      Cervical: No cervical adenopathy.   Skin:     General: Skin is warm.      Findings: No rash.   Neurological:      Mental Status: He is alert and oriented to person, place, and time.   Psychiatric:         Behavior: Behavior normal.                                  Assessment & Plan  Sinus pressure    Orders:    POCT CoV-2, Flu A/B, RSV by PCR    cetirizine (ZYRTEC) 10 MG Tab; Take 1 Tablet by mouth every day for 14 days.    Fluid level behind tympanic membrane of both ears    Orders:    POCT CoV-2, Flu A/B, RSV by PCR    Primary hypertension          Lengthy discussion with the patient today as I feel that symptoms may be more related to viral etiology hence why current antibiotic is not making a huge difference in symptomatology.  COVID, flu, RSV all negative in clinic patient was notified via [employer].  I do feel that patient sinus medication is elevating patient's blood pressure he is to discontinue this at this time he is only to utilize Tylenol as needed for sinus pain pressure and I will initiate the above to assist with sinus pressure and congestion as patient does have history of seasonal triggers.  Finally patient is to have close follow-up with ENT in the future for further second opinion.      Differential diagnosis, natural history, supportive care, and indications for immediate follow-up discussed. Side effects of OTC or prescribed medications discussed.      Follow-up as needed if symptoms worsen or fail to improve to PCP, Urgent care or Emergency Room.     I have personally reviewed prior external notes and test results pertinent to today's visit.  I have independently reviewed and interpreted all diagnostics ordered during this urgent care acute visit.   Discussed management options (risks,benefits, and alternatives to treatment).    The patient and/or guardian demonstrated a good understanding and agreed with the treatment plan. And all questions were answered.  Please note that this dictation was created using voice recognition software. I have made a reasonable attempt to correct obvious errors, but I expect that there are errors of grammar and possibly content that I did not discover before finalizing the note.

## 2025-05-01 ENCOUNTER — TELEPHONE (OUTPATIENT)
Dept: HEALTH INFORMATION MANAGEMENT | Facility: OTHER | Age: 50
End: 2025-05-01
Payer: COMMERCIAL

## 2025-06-29 DIAGNOSIS — I10 PRIMARY HYPERTENSION: ICD-10-CM

## 2025-06-30 RX ORDER — LOSARTAN POTASSIUM 100 MG/1
100 TABLET ORAL DAILY
Qty: 90 TABLET | Refills: 0 | Status: SHIPPED | OUTPATIENT
Start: 2025-06-30

## 2025-07-03 ENCOUNTER — OFFICE VISIT (OUTPATIENT)
Dept: URGENT CARE | Facility: PHYSICIAN GROUP | Age: 50
End: 2025-07-03
Payer: COMMERCIAL

## 2025-07-03 VITALS
OXYGEN SATURATION: 98 % | SYSTOLIC BLOOD PRESSURE: 160 MMHG | DIASTOLIC BLOOD PRESSURE: 96 MMHG | HEART RATE: 97 BPM | WEIGHT: 240.2 LBS | BODY MASS INDEX: 36.4 KG/M2 | TEMPERATURE: 98 F | HEIGHT: 68 IN | RESPIRATION RATE: 16 BRPM

## 2025-07-03 DIAGNOSIS — R11.0 NAUSEA: ICD-10-CM

## 2025-07-03 DIAGNOSIS — J01.00 ACUTE NON-RECURRENT MAXILLARY SINUSITIS: Primary | ICD-10-CM

## 2025-07-03 PROCEDURE — 99214 OFFICE O/P EST MOD 30 MIN: CPT | Performed by: STUDENT IN AN ORGANIZED HEALTH CARE EDUCATION/TRAINING PROGRAM

## 2025-07-03 PROCEDURE — 3080F DIAST BP >= 90 MM HG: CPT | Performed by: STUDENT IN AN ORGANIZED HEALTH CARE EDUCATION/TRAINING PROGRAM

## 2025-07-03 PROCEDURE — 3077F SYST BP >= 140 MM HG: CPT | Performed by: STUDENT IN AN ORGANIZED HEALTH CARE EDUCATION/TRAINING PROGRAM

## 2025-07-03 RX ORDER — ONDANSETRON 4 MG/1
4 TABLET, FILM COATED ORAL EVERY 4 HOURS PRN
Qty: 20 TABLET | Refills: 0 | Status: SHIPPED | OUTPATIENT
Start: 2025-07-03 | End: 2025-07-08

## 2025-07-03 ASSESSMENT — ENCOUNTER SYMPTOMS: SINUS PAIN: 1

## 2025-07-04 NOTE — PROGRESS NOTES
"Subjective:   Guilherme Williamson is a 50 y.o. male who presents for Otalgia (Bilateral ear pain, left ear is worse. X today) and Sinusitis (Facial pressure, X today.)      HPI:  50 male presents with 6 days of worsening bilateral ear pain and sinus pressure and discomfort and now extreme worsening of pressure behind his eyes over the past 12 hours.  He has been doing daily Flonase and antihistamine with no significant proving as well as daily sinus rinses.  Initially it was helping for the first 2 days but then got gradually worse as time progressed.  General fever body aches and chills as well.    Review of Systems   HENT:  Positive for congestion, ear pain and sinus pain.        Medications:    amLODIPine Tabs  Azelastine Soln  losartan Tabs  naproxen Tabs  promethazine-dextromethorphan    Allergies: Flonase [fluticasone] and Other drug    Problem List: Guilherme Williamson does not have any pertinent problems on file.    Surgical History:  No past surgical history on file.    Past Social Hx: Guilherme Williamson  reports that he quit smoking about 24 years ago. His smoking use included cigarettes. He started smoking about 28 years ago. He has a 1 pack-year smoking history. He has been exposed to tobacco smoke. He has never used smokeless tobacco. He reports that he does not currently use alcohol. He reports that he does not use drugs.     Past Family Hx:  Guilherme Williamson family history includes Diabetes in his paternal grandmother; Hypertension in his father; Stroke in his father and paternal grandfather.     Problem list, medications, and allergies reviewed by myself today in Epic.     Objective:     BP (!) 160/96 (BP Location: Left arm, Patient Position: Sitting, BP Cuff Size: Large adult)   Pulse 97   Temp 36.7 °C (98 °F)   Resp 16   Ht 1.727 m (5' 8\")   Wt 109 kg (240 lb 3.2 oz)   SpO2 98%   BMI 36.52 kg/m²     Physical Exam  Constitutional:       Appearance: Normal appearance. "   HENT:      Head: Normocephalic and atraumatic.      Right Ear: Tympanic membrane normal.      Left Ear: Tympanic membrane normal.      Nose: Congestion and rhinorrhea present.      Comments: Frontal sinus tenderness on examination.  Bilateral turbinates swollen and erythematous     Mouth/Throat:      Pharynx: No oropharyngeal exudate or posterior oropharyngeal erythema.   Eyes:      Extraocular Movements: Extraocular movements intact.      Conjunctiva/sclera: Conjunctivae normal.   Cardiovascular:      Rate and Rhythm: Normal rate and regular rhythm.      Pulses: Normal pulses.      Heart sounds: Normal heart sounds.   Pulmonary:      Effort: Pulmonary effort is normal.      Breath sounds: Normal breath sounds.   Neurological:      Mental Status: He is alert.         Assessment/Plan:     Diagnosis and associated orders:     1. Acute non-recurrent maxillary sinusitis  amoxicillin-clavulanate (AUGMENTIN) 875-125 MG Tab      2. Nausea  ondansetron (ZOFRAN) 4 MG Tab tablet         Comments/MDM:     1. Nausea (Primary)  Patient reports in the past 12 hours he has had increased sinus pressure and discomfort causing some episodes of vertigo and some nausea.  Will treat with Zofran   - ondansetron (ZOFRAN) 4 MG Tab tablet; Take 1 Tablet by mouth every four hours as needed for Nausea/Vomiting for up to 5 days.  Dispense: 20 Tablet; Refill: 0    2. Acute non-recurrent maxillary sinusitis  Antibiotics as below     Symptomatic Management  - Nasal decongestants (e.g., pseudoephedrine 30 mg every 4-6 hours as tolerated) for congestion.  - Saline nasal irrigation: Recommend using saline spray or neti pot to flush out nasal passages.  - Intranasal corticosteroids (e.g., Fluticasone nasal spray) to reduce inflammation, especially if there is a history of allergies.  - Pain relief: Recommend acetaminophen or ibuprofen for pain and fever.  -Warm humidified air or steam inhalation for relief for congestion- Use of once daily  antihistamine.  -Follow up in 7-10 days or sooner if worsening    Education     - Advise the patient on proper use of nasal sprays and decongestants to avoid overuse.     - Discuss the importance of completing the full course of antibiotics to prevent resistance and relapse.     - Encourage adequate hydration and rest.     - Advise avoiding triggers such as smoking or environmental irritants.    Patient educated on on signs of complications (e.g., fever, worsening headache, facial swelling, vision changes) and when to seek further medical attention.    - amoxicillin-clavulanate (AUGMENTIN) 875-125 MG Tab; Take 1 Tablet by mouth 2 times a day for 5 days.  Dispense: 10 Tablet; Refill: 0           Differential diagnosis, natural history, supportive care, and indications for immediate follow-up discussed.    Advised the patient to follow-up with the primary care physician for recheck, reevaluation, and consideration of further management.    Please note that this dictation was created using voice recognition software. I have made a reasonable attempt to correct obvious errors, but I expect that there are errors of grammar and possibly content that I did not discover before finalizing the note.    Theodore Masterson M.D.

## 2025-07-17 ENCOUNTER — OFFICE VISIT (OUTPATIENT)
Dept: URGENT CARE | Facility: PHYSICIAN GROUP | Age: 50
End: 2025-07-17
Payer: COMMERCIAL

## 2025-07-17 VITALS
SYSTOLIC BLOOD PRESSURE: 132 MMHG | HEART RATE: 87 BPM | RESPIRATION RATE: 14 BRPM | OXYGEN SATURATION: 97 % | BODY MASS INDEX: 35.75 KG/M2 | TEMPERATURE: 97.5 F | WEIGHT: 235.89 LBS | DIASTOLIC BLOOD PRESSURE: 84 MMHG | HEIGHT: 68 IN

## 2025-07-17 DIAGNOSIS — J32.9 RECURRENT SINUSITIS: Primary | ICD-10-CM

## 2025-07-17 DIAGNOSIS — J30.2 SEASONAL ALLERGIES: ICD-10-CM

## 2025-07-17 PROCEDURE — 99213 OFFICE O/P EST LOW 20 MIN: CPT | Performed by: PHYSICIAN ASSISTANT

## 2025-07-17 PROCEDURE — 3075F SYST BP GE 130 - 139MM HG: CPT | Performed by: PHYSICIAN ASSISTANT

## 2025-07-17 PROCEDURE — 3079F DIAST BP 80-89 MM HG: CPT | Performed by: PHYSICIAN ASSISTANT

## 2025-07-17 RX ORDER — AZELASTINE 1 MG/ML
2 SPRAY, METERED NASAL 2 TIMES DAILY
Qty: 30 ML | Refills: 0 | Status: SHIPPED | OUTPATIENT
Start: 2025-07-17

## 2025-07-17 ASSESSMENT — ENCOUNTER SYMPTOMS
DIZZINESS: 0
WHEEZING: 0
HEADACHES: 1
SINUS PAIN: 1
DIAPHORESIS: 0
FEVER: 0
COUGH: 0
DIARRHEA: 0
SORE THROAT: 0
CHILLS: 0
PALPITATIONS: 0
VOMITING: 0
NAUSEA: 0
MYALGIAS: 0
SHORTNESS OF BREATH: 0
ABDOMINAL PAIN: 0
SPUTUM PRODUCTION: 0

## 2025-07-18 NOTE — PROGRESS NOTES
Subjective:     CHIEF COMPLAINT  Chief Complaint   Patient presents with    Sinus Problem     Was seen 2 weeks prior for sinus issue was given prescription and it did not relive the symptoms.   Sinius pressure between eyes   Feels dizzy        HPI  Guilherme Williamson is a very pleasant 50 y.o. male who presents to the clinic with persistent sinus pain and pressure that has been ongoing for the last 2 weeks.  Patient has a history of recurrent sinusitis.  Currently experiencing pressure over both the frontal and maxillary sinuses.  Notes occasional sinus headaches and dizziness secondary to the pressure.  He was originally experiencing bodyaches, chills and subjective fever when he was seen for this on 7/3/2025.  States he was started on a 5-day course of Augmentin and noted temporary relief while on the antibiotics however symptoms shortly returned.  He has also been using nasal saline rinses and a cold and sinus medication without any significant relief.    REVIEW OF SYSTEMS  Review of Systems   Constitutional:  Negative for chills, diaphoresis, fever and malaise/fatigue.   HENT:  Positive for congestion and sinus pain. Negative for ear pain and sore throat.    Respiratory:  Negative for cough, sputum production, shortness of breath and wheezing.    Cardiovascular:  Negative for chest pain and palpitations.   Gastrointestinal:  Negative for abdominal pain, diarrhea, nausea and vomiting.   Musculoskeletal:  Negative for myalgias.   Neurological:  Positive for headaches. Negative for dizziness.   Endo/Heme/Allergies:  Positive for environmental allergies.       PAST MEDICAL HISTORY  Patient Active Problem List    Diagnosis Date Noted    Type 2 diabetes mellitus without complication, without long-term current use of insulin (HCC) 07/10/2024    Stress reaction 10/23/2023    Recurrent sinusitis 08/02/2023    Chronic pansinusitis 02/22/2022    Hypertension 08/04/2014    Hyperglycemia 08/04/2014    Dyslipidemia  "2014    Obesity (BMI 30-39.9) 2014       SURGICAL HISTORY  patient denies any surgical history    ALLERGIES  Allergies[1]    CURRENT MEDICATIONS  Home Medications       Reviewed by Carroll Mejia P.A.-C. (Physician Assistant) on 25 at 1728  Med List Status: <None>     Medication Last Dose Status   amLODIPine (NORVASC) 10 MG Tab Taking Active   Azelastine (ASTELIN) 137 MCG/SPRAY Solution  Active   losartan (COZAAR) 100 MG Tab Taking Active   naproxen (NAPROSYN) 500 MG Tab  Active   promethazine-dextromethorphan (PROMETHAZINE-DM) 6.25-15 MG/5ML syrup  Active                    SOCIAL HISTORY  Social History     Tobacco Use    Smoking status: Former     Current packs/day: 0.00     Average packs/day: 0.3 packs/day for 4.0 years (1.0 ttl pk-yrs)     Types: Cigarettes     Start date: 1996     Quit date: 2000     Years since quittin.9     Passive exposure: Past    Smokeless tobacco: Never    Tobacco comments:     3-5 years ago   Vaping Use    Vaping status: Never Used   Substance and Sexual Activity    Alcohol use: Not Currently     Comment: a couple beers a month    Drug use: No    Sexual activity: Yes     Partners: Female       FAMILY HISTORY  Family History   Problem Relation Age of Onset    Stroke Father     Hypertension Father     Diabetes Paternal Grandmother     Stroke Paternal Grandfather     Cancer Neg Hx     Heart Disease Neg Hx           Objective:     VITAL SIGNS: /84 (BP Location: Right arm, Patient Position: Sitting, BP Cuff Size: Adult)   Pulse 87   Temp 36.4 °C (97.5 °F) (Temporal)   Resp 14   Ht 1.727 m (5' 8\")   Wt 107 kg (235 lb 14.3 oz)   SpO2 97%   BMI 35.87 kg/m²     PHYSICAL EXAM  Physical Exam  Constitutional:       General: He is not in acute distress.     Appearance: Normal appearance. He is not ill-appearing, toxic-appearing or diaphoretic.   HENT:      Head: Normocephalic and atraumatic.      Comments: Tenderness to light palpation over the frontal and " maxillary sinuses     Right Ear: Tympanic membrane, ear canal and external ear normal.      Left Ear: Tympanic membrane, ear canal and external ear normal.      Nose: Congestion and rhinorrhea present.      Mouth/Throat:      Mouth: Mucous membranes are moist.      Pharynx: No oropharyngeal exudate or posterior oropharyngeal erythema.   Eyes:      Conjunctiva/sclera: Conjunctivae normal.   Cardiovascular:      Rate and Rhythm: Normal rate and regular rhythm.      Pulses: Normal pulses.      Heart sounds: Normal heart sounds.   Pulmonary:      Effort: Pulmonary effort is normal.      Breath sounds: Normal breath sounds. No wheezing, rhonchi or rales.   Musculoskeletal:      Cervical back: Normal range of motion. No muscular tenderness.   Lymphadenopathy:      Cervical: No cervical adenopathy.   Skin:     General: Skin is warm and dry.      Capillary Refill: Capillary refill takes less than 2 seconds.   Neurological:      Mental Status: He is alert.   Psychiatric:         Mood and Affect: Mood normal.         Thought Content: Thought content normal.         Assessment/Plan:     1. Recurrent sinusitis  - amoxicillin-clavulanate (AUGMENTIN) 875-125 MG Tab; Take 1 Tablet by mouth 2 times a day for 7 days.  Dispense: 14 Tablet; Refill: 0    2. Seasonal allergies  - azelastine (ASTELIN) 0.1 % nasal spray; Administer 2 Sprays into affected nostril(S) 2 times a day.  Dispense: 30 mL; Refill: 0      MDM/Comments:    -Nasal spray and allergy medications as directed (Zyrtec or Loratadine).  -You may try saline irrigation or neti pot.   -Drink plenty of fluids.  -Ibuprofen or Tylenol as directed for pain.   -Warm compress to sinuses.      Follow up with primary care provider. Urgently for worsening symptoms, persistent fevers, facial swelling, visual changes, weakness, elevated heart rate, stiff neck, symptoms last longer than 10 days, or any other concerns.      Differential diagnosis, natural history, supportive care, and  indications for immediate follow-up discussed. All questions answered. Patient agrees with the plan of care.    Follow-up as needed if symptoms worsen or fail to improve to PCP, Urgent care or Emergency Room.    I have personally reviewed prior external notes and test results pertinent to today's visit.  I have independently reviewed and interpreted all diagnostics ordered during this urgent care acute visit.   Discussed management options (risks,benefits, and alternatives to treatment). Pt expresses understanding and the treatment plan was agreed upon. Questions were encouraged and answered to pt's satisfaction.    Please note that this dictation was created using voice recognition software. I have made a reasonable attempt to correct obvious errors, but I expect that there are errors of grammar and possibly content that I did not discover before finalizing the note.       [1]   Allergies  Allergen Reactions    Flonase [Fluticasone]      Eye    Other Drug      Can't take anything with steroids, caused blindness in eye.

## 2025-07-20 ENCOUNTER — HOSPITAL ENCOUNTER (EMERGENCY)
Facility: MEDICAL CENTER | Age: 50
End: 2025-07-20
Attending: EMERGENCY MEDICINE
Payer: COMMERCIAL

## 2025-07-20 VITALS
DIASTOLIC BLOOD PRESSURE: 104 MMHG | TEMPERATURE: 96.8 F | HEIGHT: 68 IN | WEIGHT: 233.9 LBS | SYSTOLIC BLOOD PRESSURE: 179 MMHG | RESPIRATION RATE: 18 BRPM | BODY MASS INDEX: 35.45 KG/M2 | OXYGEN SATURATION: 93 % | HEART RATE: 81 BPM

## 2025-07-20 DIAGNOSIS — H92.02 EAR PAIN, LEFT: Primary | ICD-10-CM

## 2025-07-20 DIAGNOSIS — J01.00 ACUTE MAXILLARY SINUSITIS, RECURRENCE NOT SPECIFIED: ICD-10-CM

## 2025-07-20 LAB
ALBUMIN SERPL BCP-MCNC: 4.1 G/DL (ref 3.2–4.9)
ALBUMIN/GLOB SERPL: 1.2 G/DL
ALP SERPL-CCNC: 104 U/L (ref 30–99)
ALT SERPL-CCNC: 75 U/L (ref 2–50)
ANION GAP SERPL CALC-SCNC: 16 MMOL/L (ref 7–16)
AST SERPL-CCNC: 36 U/L (ref 12–45)
BASOPHILS # BLD AUTO: 1.1 % (ref 0–1.8)
BASOPHILS # BLD: 0.08 K/UL (ref 0–0.12)
BILIRUB SERPL-MCNC: 0.6 MG/DL (ref 0.1–1.5)
BUN SERPL-MCNC: 15 MG/DL (ref 8–22)
CALCIUM ALBUM COR SERPL-MCNC: 9.4 MG/DL (ref 8.5–10.5)
CALCIUM SERPL-MCNC: 9.5 MG/DL (ref 8.5–10.5)
CHLORIDE SERPL-SCNC: 100 MMOL/L (ref 96–112)
CO2 SERPL-SCNC: 21 MMOL/L (ref 20–33)
CREAT SERPL-MCNC: 1.02 MG/DL (ref 0.5–1.4)
EOSINOPHIL # BLD AUTO: 0.15 K/UL (ref 0–0.51)
EOSINOPHIL NFR BLD: 2.1 % (ref 0–6.9)
ERYTHROCYTE [DISTWIDTH] IN BLOOD BY AUTOMATED COUNT: 38.5 FL (ref 35.9–50)
GFR SERPLBLD CREATININE-BSD FMLA CKD-EPI: 90 ML/MIN/1.73 M 2
GLOBULIN SER CALC-MCNC: 3.4 G/DL (ref 1.9–3.5)
GLUCOSE SERPL-MCNC: 234 MG/DL (ref 65–99)
HCT VFR BLD AUTO: 48.8 % (ref 42–52)
HGB BLD-MCNC: 17.8 G/DL (ref 14–18)
IMM GRANULOCYTES # BLD AUTO: 0.02 K/UL (ref 0–0.11)
IMM GRANULOCYTES NFR BLD AUTO: 0.3 % (ref 0–0.9)
LYMPHOCYTES # BLD AUTO: 2.21 K/UL (ref 1–4.8)
LYMPHOCYTES NFR BLD: 31.4 % (ref 22–41)
MCH RBC QN AUTO: 31.8 PG (ref 27–33)
MCHC RBC AUTO-ENTMCNC: 36.5 G/DL (ref 32.3–36.5)
MCV RBC AUTO: 87.3 FL (ref 81.4–97.8)
MONOCYTES # BLD AUTO: 0.6 K/UL (ref 0–0.85)
MONOCYTES NFR BLD AUTO: 8.5 % (ref 0–13.4)
NEUTROPHILS # BLD AUTO: 3.97 K/UL (ref 1.82–7.42)
NEUTROPHILS NFR BLD: 56.6 % (ref 44–72)
NRBC # BLD AUTO: 0 K/UL
NRBC BLD-RTO: 0 /100 WBC (ref 0–0.2)
PLATELET # BLD AUTO: 245 K/UL (ref 164–446)
PMV BLD AUTO: 9.9 FL (ref 9–12.9)
POTASSIUM SERPL-SCNC: 3.1 MMOL/L (ref 3.6–5.5)
PROT SERPL-MCNC: 7.5 G/DL (ref 6–8.2)
RBC # BLD AUTO: 5.59 M/UL (ref 4.7–6.1)
SODIUM SERPL-SCNC: 137 MMOL/L (ref 135–145)
WBC # BLD AUTO: 7 K/UL (ref 4.8–10.8)

## 2025-07-20 PROCEDURE — 700111 HCHG RX REV CODE 636 W/ 250 OVERRIDE (IP): Mod: JZ | Performed by: EMERGENCY MEDICINE

## 2025-07-20 PROCEDURE — 36415 COLL VENOUS BLD VENIPUNCTURE: CPT

## 2025-07-20 PROCEDURE — 96365 THER/PROPH/DIAG IV INF INIT: CPT

## 2025-07-20 PROCEDURE — 80053 COMPREHEN METABOLIC PANEL: CPT

## 2025-07-20 PROCEDURE — 99284 EMERGENCY DEPT VISIT MOD MDM: CPT

## 2025-07-20 PROCEDURE — 700105 HCHG RX REV CODE 258: Performed by: EMERGENCY MEDICINE

## 2025-07-20 PROCEDURE — 96375 TX/PRO/DX INJ NEW DRUG ADDON: CPT

## 2025-07-20 PROCEDURE — 85025 COMPLETE CBC W/AUTO DIFF WBC: CPT

## 2025-07-20 RX ORDER — OXYCODONE AND ACETAMINOPHEN 5; 325 MG/1; MG/1
1-2 TABLET ORAL EVERY 6 HOURS PRN
Qty: 10 TABLET | Refills: 0 | Status: SHIPPED | OUTPATIENT
Start: 2025-07-20 | End: 2025-07-22

## 2025-07-20 RX ORDER — KETOROLAC TROMETHAMINE 15 MG/ML
15 INJECTION, SOLUTION INTRAMUSCULAR; INTRAVENOUS ONCE
Status: COMPLETED | OUTPATIENT
Start: 2025-07-20 | End: 2025-07-20

## 2025-07-20 RX ORDER — HYDROMORPHONE HYDROCHLORIDE 1 MG/ML
1 INJECTION, SOLUTION INTRAMUSCULAR; INTRAVENOUS; SUBCUTANEOUS ONCE
Status: COMPLETED | OUTPATIENT
Start: 2025-07-20 | End: 2025-07-20

## 2025-07-20 RX ORDER — ONDANSETRON 2 MG/ML
4 INJECTION INTRAMUSCULAR; INTRAVENOUS ONCE
Status: COMPLETED | OUTPATIENT
Start: 2025-07-20 | End: 2025-07-20

## 2025-07-20 RX ADMIN — HYDROMORPHONE HYDROCHLORIDE 1 MG: 1 INJECTION, SOLUTION INTRAMUSCULAR; INTRAVENOUS; SUBCUTANEOUS at 18:38

## 2025-07-20 RX ADMIN — ONDANSETRON 4 MG: 2 INJECTION INTRAMUSCULAR; INTRAVENOUS at 18:38

## 2025-07-20 RX ADMIN — KETOROLAC TROMETHAMINE 15 MG: 15 INJECTION, SOLUTION INTRAMUSCULAR; INTRAVENOUS at 19:01

## 2025-07-20 RX ADMIN — AMPICILLIN AND SULBACTAM 3 G: 2; 1 INJECTION, POWDER, FOR SOLUTION INTRAMUSCULAR; INTRAVENOUS at 18:38

## 2025-07-21 NOTE — ED PROVIDER NOTES
CHIEF COMPLAINT  Chief Complaint   Patient presents with    Sinus Pain     Having pressure to face  behind eyes       Ear Pain     Having pain to both ears       Other     Has bee ill since the beginning of July   has been seen twice at  for same and was given Augmentin for it twice  not helping   feeling worse now  has been having hot flashes          LIMITATION TO HISTORY   None      AYUSH    Guilherme Williamson is a 50 y.o. male who has a history of sinusitis with current workup and referral with [facility] comes in today with sinus pressure ear pain vertigo.    Patient states that he was feeling better after a 5-day course of Augmentin but then after symptoms of finish he started getting worsening symptoms.  He states when he is taken 10-day course he actually resolved last time he had to be here was about 3 month ration.  Patient states that he is having the same symptom of pressure over the eyes going back to his head.  Now is having ear pain and pressure.  He feels like his lymph nodes are swollen in his neck and also he felt a bump underneath his left ear that was painful.  It is still painful at this time.  The patient states he has intermittent vertigo.  No other associated symptoms        OUTSIDE HISTORIAN(S):  But he by his wife states that he is quite miserable with the pain.    EXTERNAL RECORDS REVIEWED  Appears the patient was seen on 17 July.  Put on Augmentin for suspected sinusitis and nasal spray.  Seen in March for the same sinusitis in addition, patient was post to see an ear nose throat doctor around March.      PAST MEDICAL HISTORY  Past Medical History[1]    FAMILY HISTORY  Family History   Problem Relation Age of Onset    Stroke Father     Hypertension Father     Diabetes Paternal Grandmother     Stroke Paternal Grandfather     Cancer Neg Hx     Heart Disease Neg Hx        SOCIAL HISTORY  Social History[2]  Social History     Substance and Sexual Activity   Drug Use No  "      SURGICAL HISTORY  Past Surgical History[3]    CURRENT MEDICATIONS  Current Medications[4]    ALLERGIES  Allergies[5]    PHYSICAL EXAM  VITAL SIGNS: BP (!) 172/123   Pulse 84   Temp 36 °C (96.8 °F) (Temporal)   Resp 16   Ht 1.727 m (5' 8\")   Wt 106 kg (233 lb 14.4 oz)   SpO2 98%   BMI 35.56 kg/m²   Reviewed and vitals review: elevated blood pressure  General: No acute distress uncomfortable appearing  Eyes: Pupils are equal round reactive to light.  Anicteric sclera.  No lid swelling noted.  Ears nose throat: Tympanic membranes both show no effusion no erythema.  There is no tenderness over the mastoid.  He has some slight lymphadenopathy just below the earlobe.  He also have some cervical lymphadenopathy noted.  Oropharynx is Nexus no edema.  There is no nasal discharge noted.  He has minimal sinus tenderness  Pulmonary: Lungs are clear to auscultation bilaterally.  No respiratory distress noted  Cardiovascular: Regular rhythm and rate.  No pedal edema.  Dermatologic: No rashes or abrasions noted on the face or arms  Neurologic: No facial droop.  Moves all upper extremities equally bilaterally.  No tongue deviation.  Alert and oriented.  Psychiatric: Affect is normal.  There is no flight of ideas.  No pressured speech.      PROBLEMS EVALUATED THIS VISIT:  Patient presents with chief complaint of pain.  Patient has a history of sinus pain and sinus infection and sinusitis.  Improved with Augmentin after 5 days and get gradually worse.  With elevated blood pressure with history of hypertension patient's physical exam because of possible eustachian tube dysfunction.    MEDICAL DECISION MAKING:  Differential Diagnosis considered (and not inclusive of the following) is: Likely this is sinusitis has a past history that is gotten better on antibiotics improved after 5 days.  Otitis media is not seen clinically.  At this point he has no clinical or mastoiditis.  No signs of any gross neurologic exam to suggest " brain abscess.      PLAN:  IV antibiotics  Pain medicine      RESULTS      Diagnostic tests and prescription drugs considered including, but not limited to: Select: Prescription medicine was given CT scan was considered    Escalation of care considered, and ultimately not performed: Re-examined and clinically improved    Barriers to care at this time, including but not limited to: Select: none    ED COURSE:          INTERVENTIONS BY ME:  Medications   ampicillin/sulbactam (Unasyn) 3 g in  mL IVPB (0 g Intravenous Stopped 7/20/25 1908)   ketorolac (Toradol) 15 MG/ML injection 15 mg (15 mg Intravenous Given 7/20/25 1901)   HYDROmorphone (Dilaudid) injection 1 mg (1 mg Intravenous Given 7/20/25 1838)   ondansetron (Zofran) syringe/vial injection 4 mg (4 mg Intravenous Given 7/20/25 1838)       Response on recheck:  Patient is feeling better blood pressure remained the same at this point we will watch the patient.    CONSULTANTS/OTHER GROUPS CONTACTED    Will call Monday for closer follow-up with ENT    FINAL DISPO PLAN   Discharge Medication List as of 7/20/2025  7:39 PM        START taking these medications    Details   oxyCODONE-acetaminophen (PERCOCET) 5-325 MG Tab Take 1-2 Tablets by mouth every 6 hours as needed for Severe Pain (pain) for up to 2 days., Disp-10 Tablet, R-0, Normal         In summary very pleasant 50-year-old gentleman with history of sinusitis recurrent sinusitis and 5-day treatment he will be increased to 10.  In and pr better at with some pain medicine and is Unasyn.  This point we discussed and patient agreed that he will return in 2 days if his symptoms do not significantly improve including vertigo at that point we will consider imaging.  At this point however the patient looks well nontoxic and no clinic signs of brain abscess meningitis or mastoiditis at this time      Followup:  [facility], Emergency Dept  [address]  [place]  [address]  [phone]  Go in 2 days  if not better and come back anytime if worse.      CONDITION: improved    FINAL IMPRESSION  1. Ear pain, left    2. Acute maxillary sinusitis, recurrence not specified                      [1]   Past Medical History:  Diagnosis Date    Contusion of rib, left, subsequent encounter 2022    Hypertension     Tinea cruris 2014    Viral upper respiratory tract infection 6/15/2022   [2]   Social History  Tobacco Use    Smoking status: Former     Current packs/day: 0.00     Average packs/day: 0.3 packs/day for 4.0 years (1.0 ttl pk-yrs)     Types: Cigarettes     Start date: 1996     Quit date: 2000     Years since quittin.9     Passive exposure: Past    Smokeless tobacco: Never    Tobacco comments:     3-5 years ago   Vaping Use    Vaping status: Never Used   Substance Use Topics    Alcohol use: Not Currently     Comment: a couple beers a month    Drug use: No   [3] History reviewed. No pertinent surgical history.  [4] No current facility-administered medications for this encounter.    Current Outpatient Medications:     azelastine (ASTELIN) 0.1 % nasal spray, Administer 2 Sprays into affected nostril(S) 2 times a day., Disp: 30 mL, Rfl: 0    amoxicillin-clavulanate (AUGMENTIN) 875-125 MG Tab, Take 1 Tablet by mouth 2 times a day for 7 days., Disp: 14 Tablet, Rfl: 0    losartan (COZAAR) 100 MG Tab, TAKE ONE TABLET BY MOUTH ONCE DAILY, Disp: 90 Tablet, Rfl: 0    amLODIPine (NORVASC) 10 MG Tab, TAKE ONE TABLET BY MOUTH ONCE DAILY, Disp: 90 Tablet, Rfl: 3    naproxen (NAPROSYN) 500 MG Tab, TAKE ONE TABLET BY MOUTH TWO TIMES A DAY. WITH FOOD (Patient not taking: Reported on 7/3/2025), Disp: 60 Tablet, Rfl: 0    Azelastine (ASTELIN) 137 MCG/SPRAY Solution, ADMINISTER 1 SPRAY INTO AFFECTED NOSTRIL(S) 2 TIMES A DAY. (Patient not taking: Reported on 7/3/2025), Disp: 90 mL, Rfl: 1    promethazine-dextromethorphan (PROMETHAZINE-DM) 6.25-15 MG/5ML syrup, Take 5 mL by mouth  at bedtime as needed for Cough. (Patient not taking: Reported on 7/3/2025), Disp: 118 mL, Rfl: 0  [5]   Allergies  Allergen Reactions    Flonase [Fluticasone]      Eye    Other Drug      Can't take anything with steroids, caused blindness in eye.

## 2025-07-21 NOTE — DISCHARGE INSTRUCTIONS
Because of your history of recurrent sinus infections, and the fact he only had 5 days of treatment last time I believe that this infection likely has just come back.  The key issue will be if Tuesday and 2 days you are not better then I want you to come back so we can repeat exam and possibly consider a CT scan.  This point however, because of the symptoms and findings I do not find a thing serious at this time please come back anytime if you feel worse.

## 2025-07-21 NOTE — ED TRIAGE NOTES
"BP (!) 172/123   Pulse 84   Temp 36 °C (96.8 °F) (Temporal)   Resp 16   Ht 1.727 m (5' 8\")   Wt 106 kg (233 lb 14.4 oz)   SpO2 98%   BMI 35.56 kg/m²   Chief Complaint   Patient presents with    Sinus Pain     Having pressure to face  behind eyes       Ear Pain     Having pain to both ears       Other     Has bee ill since the beginning of July   has been seen twice at  for same and was given Augmentin for it twice  not helping   feeling worse now  has been having hot flashes      Comes in w/ wife   continues to have sinus pain and whole head hurts   "

## 2025-07-23 ENCOUNTER — OFFICE VISIT (OUTPATIENT)
Dept: MEDICAL GROUP | Facility: PHYSICIAN GROUP | Age: 50
End: 2025-07-23
Payer: COMMERCIAL

## 2025-07-23 VITALS
SYSTOLIC BLOOD PRESSURE: 132 MMHG | OXYGEN SATURATION: 98 % | WEIGHT: 234 LBS | HEART RATE: 84 BPM | BODY MASS INDEX: 35.46 KG/M2 | RESPIRATION RATE: 16 BRPM | HEIGHT: 68 IN | DIASTOLIC BLOOD PRESSURE: 82 MMHG | TEMPERATURE: 97.3 F

## 2025-07-23 DIAGNOSIS — J01.90 ACUTE SINUSITIS, RECURRENCE NOT SPECIFIED, UNSPECIFIED LOCATION: Primary | ICD-10-CM

## 2025-07-23 DIAGNOSIS — E11.65 TYPE 2 DIABETES MELLITUS WITH HYPERGLYCEMIA, WITHOUT LONG-TERM CURRENT USE OF INSULIN (HCC): ICD-10-CM

## 2025-07-23 DIAGNOSIS — I10 PRIMARY HYPERTENSION: ICD-10-CM

## 2025-07-23 DIAGNOSIS — E87.6 HYPOKALEMIA: ICD-10-CM

## 2025-07-23 PROBLEM — E11.9 TYPE 2 DIABETES MELLITUS WITHOUT COMPLICATION, WITHOUT LONG-TERM CURRENT USE OF INSULIN (HCC): Status: RESOLVED | Noted: 2024-07-10 | Resolved: 2025-07-23

## 2025-07-23 PROCEDURE — 3079F DIAST BP 80-89 MM HG: CPT | Performed by: INTERNAL MEDICINE

## 2025-07-23 PROCEDURE — 3075F SYST BP GE 130 - 139MM HG: CPT | Performed by: INTERNAL MEDICINE

## 2025-07-23 PROCEDURE — 99214 OFFICE O/P EST MOD 30 MIN: CPT | Performed by: INTERNAL MEDICINE

## 2025-07-23 RX ORDER — POTASSIUM CHLORIDE 1500 MG/1
20 TABLET, EXTENDED RELEASE ORAL DAILY
Qty: 7 TABLET | Refills: 0 | Status: SHIPPED | OUTPATIENT
Start: 2025-07-23

## 2025-07-23 ASSESSMENT — FIBROSIS 4 INDEX: FIB4 SCORE: 0.83

## 2025-07-23 NOTE — PROGRESS NOTES
PRIMARY CARE CLINIC VISIT        Chief Complaint   Patient presents with    Follow-Up     ER follow on 7/20/25      Sinus pressure/congestion  Hypokalemia  Hyperglycemia  Hypertension    Pcp TAYLOR Mills      History of Present Illness     Acute sinusitis  This is a new condition.  The patient reported sinus pressure and congestion since the last couple weeks.  The patient denies fever or chills.    Patient reported that he was seen in the ER  Patient was given prescription for Augmentin however he was not able to pick it up at the pharmacy    Hypertension  This is a chronic condition   the patient currently taking amlodipine.  He also takes losartan.  Patient denies chest pain shortness of breath or palpitation    Hypokalemia  Lab test done in the ER showed potassium level 3.1.  Result discussed with the patient.    Diabetes mellitus   Chronic condition.  Lab test showed elevated glucose level 234.  Patient currently not on therapy.  In addition liver enzyme mildly elevated.  Discussed with the patient and wife today.    Medications Ordered Prior to Encounter[1]     Allergies: Flonase [fluticasone] and Other drug    Current Medications and Prescriptions Ordered in Epic[2]    Past Medical History[3]    Past Surgical History[4]    Family History   Problem Relation Age of Onset    Stroke Father     Hypertension Father     Diabetes Paternal Grandmother     Stroke Paternal Grandfather     Cancer Neg Hx     Heart Disease Neg Hx        Tobacco Use History[5]    Social History     Substance and Sexual Activity   Alcohol Use Not Currently    Comment: a couple beers a month       Review of systems  As per HPI above. All other systems reviewed and negative.      Past Medical, Social, and Family history reviewed and updated in [facility]       LAB DATA:     I have independently reviewed / interpreted labs    Lab Results   Component Value Date/Time    HBA1C 9.7 (A) 10/08/2024 08:01 AM    HBA1C 9.9 (A) 07/10/2024 05:04 PM  "   HBA1C 5.4 08/08/2014 12:09 PM        Lab Results   Component Value Date/Time    WBC 7.0 07/20/2025 06:33 PM    HEMOGLOBIN 17.8 07/20/2025 06:33 PM    HEMATOCRIT 48.8 07/20/2025 06:33 PM    MCV 87.3 07/20/2025 06:33 PM    PLATELETCT 245 07/20/2025 06:33 PM       Lab Results   Component Value Date/Time    SODIUM 137 07/20/2025 06:33 PM    POTASSIUM 3.1 (L) 07/20/2025 06:33 PM    GLUCOSE 234 (H) 07/20/2025 06:33 PM    BUN 15 07/20/2025 06:33 PM    CREATININE 1.02 07/20/2025 06:33 PM       Lab Results   Component Value Date/Time    CHOLSTRLTOT 151 08/08/2014 12:09 PM    TRIGLYCERIDE 182 (H) 08/08/2014 12:09 PM    HDL 41 08/08/2014 12:09 PM    LDL 74 08/08/2014 12:09 PM       Lab Results   Component Value Date/Time    ALTSGPT 75 (H) 07/20/2025 06:33 PM          Objective     /82 (BP Location: Right arm, Patient Position: Sitting)   Pulse 84   Temp 36.3 °C (97.3 °F) (Temporal)   Resp 16   Ht 1.727 m (5' 8\")   Wt 106 kg (234 lb)   SpO2 98%    Body mass index is 35.58 kg/m².    General: alert in no apparent distress.  Cardiovascular: regular rate and rhythm  Pulmonary: lungs : no wheezing   Gastrointestinal: BS present.   Sinus exam with mucus formation and purulent drainage noted bilaterally.  Oropharynx no exudates    Assessment and Plan     1. Acute sinusitis, recurrence not specified, unspecified location  Acute condition.  Unstable.  The patient to  antibiotic Augmentin to start the medication ASAP.  Patient to follow-up with PCP next week    2. Hypokalemia  - potassium chloride SA (KDUR) 20 MEQ Tab CR; Take 1 Tablet by mouth every day.  Dispense: 7 Tablet; Refill: 0  New condition.  Potassium level low.  Advised patient to stop potassium chloride 1 tablet daily.  Patient to follow-up with PCP next week to repeat the lab test    3. Primary hypertension  Chronic stable condition.  Continue amlodipine 10 mg daily and losartan 1 mg daily    4. Type 2 diabetes mellitus with hyperglycemia, without " long-term current use of insulin (HCC)  Chronic condition.  Unstable.  Patient with elevated glucose and A1c.  Today I discussed w patient regarding treatment however the patient would like to wait until he follow-up with his PCP next week  Clinical notes:   -Discussed with the patient goals for Diabetes management:   HbA1C < 7% /  Fasting BG   /  2 hrs post meal BG below 160  -Reviewed pathophysiology of diabetes and the importance of glycemic control to reduce the risk of diabetes related complications   Microvascular: retinopathy, neuropathy, nephropathy  Macrovascular: heart attack, stroke, peripheral vascular dz  -Advised pt to monitor sugar closely  -Counseled pt the importance of recognizing and treating hypoglycemia.   -The importance of increasing physical activity to improve diabetes control  discussed with the patient.   -Patient was also educated on changing diet and making better choices to help control blood sugar.        5.  Elevated liver enzyme.  Chronic condition.  Could be due to hepatic steatosis.  Rule out other causes.  Patient to follow-up with PCP.  Patient stated that he does not drink alcohol.      Stressed importance of follow-up with PCP next week.  The patient voiced understanding.          Please note that this dictation was created using voice recognition software. I am continuously working with the software to minimize the number of voice recognition errors, and I have made every attempt to manually correct the errors within my dictation. However, errors related to this voice recognition software may still exist and should be interpreted within the appropriate context.     Jerry Melissa MD  Internal Medicine  [facility]       [1]   Current Outpatient Medications on File Prior to Visit   Medication Sig Dispense Refill    amoxicillin-clavulanate (AUGMENTIN) 875-125 MG Tab Take 1 Tablet by mouth 2 times a day for 10 days. 20 Tablet 0    azelastine (ASTELIN) 0.1 %  nasal spray Administer 2 Sprays into affected nostril(S) 2 times a day. 30 mL 0    losartan (COZAAR) 100 MG Tab TAKE ONE TABLET BY MOUTH ONCE DAILY 90 Tablet 0    amLODIPine (NORVASC) 10 MG Tab TAKE ONE TABLET BY MOUTH ONCE DAILY 90 Tablet 3    naproxen (NAPROSYN) 500 MG Tab TAKE ONE TABLET BY MOUTH TWO TIMES A DAY. WITH FOOD (Patient not taking: Reported on 7/3/2025) 60 Tablet 0    Azelastine (ASTELIN) 137 MCG/SPRAY Solution ADMINISTER 1 SPRAY INTO AFFECTED NOSTRIL(S) 2 TIMES A DAY. (Patient not taking: Reported on 7/23/2025) 90 mL 1    promethazine-dextromethorphan (PROMETHAZINE-DM) 6.25-15 MG/5ML syrup Take 5 mL by mouth at bedtime as needed for Cough. (Patient not taking: Reported on 7/23/2025) 118 mL 0     No current facility-administered medications on file prior to visit.   [2]   Current Outpatient Medications Ordered in Epic   Medication Sig Dispense Refill    potassium chloride SA (KDUR) 20 MEQ Tab CR Take 1 Tablet by mouth every day. 7 Tablet 0    amoxicillin-clavulanate (AUGMENTIN) 875-125 MG Tab Take 1 Tablet by mouth 2 times a day for 10 days. 20 Tablet 0    azelastine (ASTELIN) 0.1 % nasal spray Administer 2 Sprays into affected nostril(S) 2 times a day. 30 mL 0    losartan (COZAAR) 100 MG Tab TAKE ONE TABLET BY MOUTH ONCE DAILY 90 Tablet 0    amLODIPine (NORVASC) 10 MG Tab TAKE ONE TABLET BY MOUTH ONCE DAILY 90 Tablet 3    naproxen (NAPROSYN) 500 MG Tab TAKE ONE TABLET BY MOUTH TWO TIMES A DAY. WITH FOOD (Patient not taking: Reported on 7/3/2025) 60 Tablet 0    Azelastine (ASTELIN) 137 MCG/SPRAY Solution ADMINISTER 1 SPRAY INTO AFFECTED NOSTRIL(S) 2 TIMES A DAY. (Patient not taking: Reported on 7/23/2025) 90 mL 1    promethazine-dextromethorphan (PROMETHAZINE-DM) 6.25-15 MG/5ML syrup Take 5 mL by mouth at bedtime as needed for Cough. (Patient not taking: Reported on 7/23/2025) 118 mL 0     No current Epic-ordered facility-administered medications on file.   [3]   Past Medical History:  Diagnosis Date     Contusion of rib, left, subsequent encounter 2022    Hypertension     Tinea cruris 2014    Viral upper respiratory tract infection 6/15/2022   [4] No past surgical history on file.  [5]   Social History  Tobacco Use   Smoking Status Former    Current packs/day: 0.00    Average packs/day: 0.3 packs/day for 4.0 years (1.0 ttl pk-yrs)    Types: Cigarettes    Start date: 1996    Quit date: 2000    Years since quittin.9    Passive exposure: Past   Smokeless Tobacco Never   Tobacco Comments    3-5 years ago

## 2025-07-23 NOTE — ASSESSMENT & PLAN NOTE
This is a chronic condition   the patient currently taking amlodipine.  He also takes losartan.  Patient denies chest pain shortness of breath or palpitation

## 2025-07-23 NOTE — ASSESSMENT & PLAN NOTE
Chronic condition.  Lab test showed elevated glucose level 234 and A1c in the 9 range..  Patient currently not on therapy.

## 2025-07-23 NOTE — ASSESSMENT & PLAN NOTE
This is a new condition.  The patient reported sinus pressure and congestion since the last couple weeks.  The patient denies fever or chills.    Patient reported that he was seen in the ER  Patient was given prescription for Augmentin however he was not able to pick it up at the pharmacy

## 2025-07-28 ENCOUNTER — OFFICE VISIT (OUTPATIENT)
Dept: MEDICAL GROUP | Facility: PHYSICIAN GROUP | Age: 50
End: 2025-07-28
Payer: COMMERCIAL

## 2025-07-28 VITALS
HEIGHT: 68 IN | RESPIRATION RATE: 18 BRPM | OXYGEN SATURATION: 98 % | WEIGHT: 236 LBS | TEMPERATURE: 98.3 F | BODY MASS INDEX: 35.77 KG/M2 | DIASTOLIC BLOOD PRESSURE: 88 MMHG | SYSTOLIC BLOOD PRESSURE: 148 MMHG | HEART RATE: 80 BPM

## 2025-07-28 DIAGNOSIS — I10 PRIMARY HYPERTENSION: ICD-10-CM

## 2025-07-28 DIAGNOSIS — J32.9 CHRONIC SINUSITIS, UNSPECIFIED LOCATION: ICD-10-CM

## 2025-07-28 DIAGNOSIS — G47.9 SLEEP DISTURBANCE: ICD-10-CM

## 2025-07-28 PROCEDURE — 99214 OFFICE O/P EST MOD 30 MIN: CPT | Performed by: FAMILY MEDICINE

## 2025-07-28 PROCEDURE — 3079F DIAST BP 80-89 MM HG: CPT | Performed by: FAMILY MEDICINE

## 2025-07-28 PROCEDURE — 3077F SYST BP >= 140 MM HG: CPT | Performed by: FAMILY MEDICINE

## 2025-07-28 RX ORDER — METOPROLOL SUCCINATE 100 MG/1
100 TABLET, EXTENDED RELEASE ORAL DAILY
Qty: 30 TABLET | Refills: 1 | Status: SHIPPED | OUTPATIENT
Start: 2025-07-28

## 2025-07-28 ASSESSMENT — PATIENT HEALTH QUESTIONNAIRE - PHQ9: CLINICAL INTERPRETATION OF PHQ2 SCORE: 0

## 2025-07-28 ASSESSMENT — FIBROSIS 4 INDEX: FIB4 SCORE: 0.83

## 2025-07-28 NOTE — PROGRESS NOTES
Subjective:     CC: Here for several issues.    HPI:   Guilherme presents today with the following medical concerns:    Hypertension  This is a chronic problem.  Patient states that his blood pressure on his home machine was 180/130 so became alarmed.  He made an appointment to see me today as his PCP was not available.  Of note he was in the emergency room on the 20th and his blood pressure was high at 179/104.  No changes were made in his regimen at that time.  He states that he had been placed on amlodipine and been on that for some time.  Losartan was added to get better control and even at max dose his blood pressure did not come down.  He wants to stop that and go to something different.  He does stress about worrying about his blood pressure.    Chronic sinusitis  This is a chronic problem.  Patient is waiting to get into [facility] but he says they told him they need a sinus CT before they can schedule him.  He has had troubles with chronic sinusitis.  I previous ENT recommended surgery but is seem like they were just wanting to do it because he has insurance and patient was very happy with that.  He has had a message in for his PCP to order the CT scan but has not heard back as she has been on vacation.  I will go ahead and order that for him today.    Sleep disturbance  This is a chronic issue.  He does have nonrestorative sleep.  And does have snoring as well as hypertension.  He has risk factors for sleep apnea and this may be compounding ability to control his hypertension.  After discussion he is agreeable to be referred for sleep apnea testing.    Past Medical History[1]    Social History[2]    Current Medications and Prescriptions Ordered in Epic[3]    Allergies:  Flonase [fluticasone] and Other drug    Health Maintenance: Completed    ROS:  Gen: no fevers/chills, no changes in weight  Eyes: no changes in vision  ENT: no sore throat, no hearing loss, no bloody nose  Pulm: no sob, no cough  CV: no  "chest pain, no palpitations  GI: no nausea/vomiting, no diarrhea  : no dysuria  MSk: no myalgias  Skin: no rash  Neuro: no headaches, no numbness/tingling  Heme/Lymph: no easy bruising      Objective:       Exam:  BP (!) 148/88 (BP Location: Right arm, Patient Position: Sitting, BP Cuff Size: Adult)   Pulse 80   Temp 36.8 °C (98.3 °F) (Temporal)   Resp 18   Ht 1.727 m (5' 8\")   Wt 107 kg (236 lb)   SpO2 98%   BMI 35.88 kg/m²  Body mass index is 35.88 kg/m².    Gen: Alert and oriented, No apparent distress.  ENT:   Ear canals and TMs are normal.  Nose has no drainage.  There is minimal congestion on the left.  Throat is clear.  Neck: Neck is supple without lymphadenopathy.  Lungs: Normal effort, CTA bilaterally, no wheezes, rhonchi, or rales  Ext: No clubbing, cyanosis, edema.      Labs: After he had left I reviewed his chart further and saw that his diabetes is not under control.  He had seen pharmacotherapy once and never followed up.  Message will be sent to him to get back into his PCP to address this.    Assessment & Plan:     50 y.o. male with the following -     1. Chronic sinusitis, unspecified location  This is a chronic problem.  I went ahead and ordered his CT scan so he can make his appointment with [facility].  He is to continue on his antibiotics.  I told him his ear looked better today so finish his course of antibiotics.  - CT-LOCALIZATION LIMITED SINUSES; Future    2. Primary hypertension  This is a chronic problem.  Will go ahead and stop his losartan and change him to metoprolol.  He is to follow-up for an MA visit or with his PCP in 1 to 2 weeks for blood pressure check.  Also to bring in his home machine to correlate the results.  - Referral to Pulmonary and Sleep Medicine    3. Sleep disturbance  This is likely a chronic problem.  Discussed issue and refer him to sleep clinic for evaluation.  - Referral to Pulmonary and Sleep Medicine      Return in about 1 week (around 8/4/2025) " for with MA or PCP for BP recheck.    Please note that this dictation was created using voice recognition software. I have made every reasonable attempt to correct obvious errors, but I expect that there are errors of grammar and possibly content that I did not discover before finalizing the note.             [1]   Past Medical History:  Diagnosis Date    Contusion of rib, left, subsequent encounter 2022    Hypertension     Tinea cruris 2014    Viral upper respiratory tract infection 6/15/2022   [2]   Social History  Tobacco Use    Smoking status: Former     Current packs/day: 0.00     Average packs/day: 0.3 packs/day for 4.0 years (1.0 ttl pk-yrs)     Types: Cigarettes     Start date: 1996     Quit date: 2000     Years since quittin.9     Passive exposure: Past    Smokeless tobacco: Never    Tobacco comments:     3-5 years ago   Vaping Use    Vaping status: Never Used   Substance Use Topics    Alcohol use: Not Currently     Comment: a couple beers a month    Drug use: No   [3]   Current Outpatient Medications Ordered in Epic   Medication Sig Dispense Refill    metoprolol SR (TOPROL XL) 100 MG TABLET SR 24 HR Take 1 Tablet by mouth every day. 30 Tablet 1    amoxicillin-clavulanate (AUGMENTIN) 875-125 MG Tab Take 1 Tablet by mouth 2 times a day for 10 days. 20 Tablet 0    amLODIPine (NORVASC) 10 MG Tab TAKE ONE TABLET BY MOUTH ONCE DAILY 90 Tablet 3    potassium chloride SA (KDUR) 20 MEQ Tab CR Take 1 Tablet by mouth every day. 7 Tablet 0    azelastine (ASTELIN) 0.1 % nasal spray Administer 2 Sprays into affected nostril(S) 2 times a day. 30 mL 0     No current Epic-ordered facility-administered medications on file.

## 2025-07-28 NOTE — ASSESSMENT & PLAN NOTE
This is a chronic problem.  Patient is waiting to get into [facility] ENT but he says they told him they need a sinus CT before they can schedule him.  He has had troubles with chronic sinusitis.  I previous ENT recommended surgery but is seem like they were just wanting to do it because he has insurance and patient was very happy with that.  He has had a message in for his PCP to order the CT scan but has not heard back as she has been on vacation.  I will go ahead and order that for him today.

## 2025-07-28 NOTE — LETTER
July 28, 2025    To Whom It May Concern:         This is confirmation that Guilhermeruth Williamson attended his scheduled appointment with Durga Rosales III, M.D. on 7/28/25.  Please excuse him from work today and for the next 2 days due to an illness.         If you have any questions please do not hesitate to call me at the phone number listed below.    Sincerely,          Durga Rosales III, M.D.  [phone]

## 2025-07-28 NOTE — ASSESSMENT & PLAN NOTE
This is a chronic issue.  He does have nonrestorative sleep.  And does have snoring as well as hypertension.  He has risk factors for sleep apnea and this may be compounding ability to control his hypertension.  After discussion he is agreeable to be referred for sleep apnea testing.

## 2025-07-28 NOTE — ASSESSMENT & PLAN NOTE
This is a chronic problem.  Patient states that his blood pressure on his home machine was 180/130 so became alarmed.  He made an appointment to see me today as his PCP was not available.  Of note he was in the emergency room on the 20th and his blood pressure was high at 179/104.  No changes were made in his regimen at that time.  He states that he had been placed on amlodipine and been on that for some time.  Losartan was added to get better control and even at max dose his blood pressure did not come down.  He wants to stop that and go to something different.  He does stress about worrying about his blood pressure.

## 2025-08-08 ENCOUNTER — HOSPITAL ENCOUNTER (OUTPATIENT)
Dept: RADIOLOGY | Facility: MEDICAL CENTER | Age: 50
End: 2025-08-08
Attending: FAMILY MEDICINE
Payer: COMMERCIAL

## 2025-08-08 DIAGNOSIS — J32.9 CHRONIC SINUSITIS, UNSPECIFIED LOCATION: ICD-10-CM

## 2025-08-08 PROCEDURE — 70486 CT MAXILLOFACIAL W/O DYE: CPT
